# Patient Record
Sex: FEMALE | Race: OTHER | Employment: FULL TIME | URBAN - METROPOLITAN AREA
[De-identification: names, ages, dates, MRNs, and addresses within clinical notes are randomized per-mention and may not be internally consistent; named-entity substitution may affect disease eponyms.]

---

## 2022-06-03 ENCOUNTER — HOSPITAL ENCOUNTER (EMERGENCY)
Facility: HOSPITAL | Age: 47
Discharge: HOME | End: 2022-06-03
Attending: EMERGENCY MEDICINE
Payer: COMMERCIAL

## 2022-06-03 ENCOUNTER — APPOINTMENT (OUTPATIENT)
Dept: CT IMAGING | Facility: HOSPITAL | Age: 47
End: 2022-06-03
Attending: EMERGENCY MEDICINE
Payer: COMMERCIAL

## 2022-06-03 VITALS
BODY MASS INDEX: 25.76 KG/M2 | RESPIRATION RATE: 18 BRPM | SYSTOLIC BLOOD PRESSURE: 137 MMHG | HEART RATE: 72 BPM | DIASTOLIC BLOOD PRESSURE: 96 MMHG | WEIGHT: 140 LBS | TEMPERATURE: 98 F | HEIGHT: 62 IN | OXYGEN SATURATION: 99 %

## 2022-06-03 DIAGNOSIS — K62.5 RECTAL BLEEDING: Primary | ICD-10-CM

## 2022-06-03 LAB
ANION GAP SERPL CALC-SCNC: 5 MMOL/L (ref 0–18)
BASOPHILS # BLD AUTO: 0.06 X10(3) UL (ref 0–0.2)
BASOPHILS NFR BLD AUTO: 0.6 %
BILIRUB UR QL: NEGATIVE
BUN BLD-MCNC: 11 MG/DL (ref 7–18)
BUN/CREAT SERPL: 14.7 (ref 10–20)
CALCIUM BLD-MCNC: 9.4 MG/DL (ref 8.5–10.1)
CHLORIDE SERPL-SCNC: 105 MMOL/L (ref 98–112)
CO2 SERPL-SCNC: 26 MMOL/L (ref 21–32)
COLOR UR: YELLOW
CREAT BLD-MCNC: 0.75 MG/DL
DEPRECATED RDW RBC AUTO: 46.2 FL (ref 35.1–46.3)
EOSINOPHIL # BLD AUTO: 0.17 X10(3) UL (ref 0–0.7)
EOSINOPHIL NFR BLD AUTO: 1.7 %
ERYTHROCYTE [DISTWIDTH] IN BLOOD BY AUTOMATED COUNT: 13.3 % (ref 11–15)
GLUCOSE BLD-MCNC: 87 MG/DL (ref 70–99)
GLUCOSE UR-MCNC: NEGATIVE MG/DL
HCT VFR BLD AUTO: 43.2 %
HGB BLD-MCNC: 14 G/DL
IMM GRANULOCYTES # BLD AUTO: 0.03 X10(3) UL (ref 0–1)
IMM GRANULOCYTES NFR BLD: 0.3 %
KETONES UR-MCNC: NEGATIVE MG/DL
LEUKOCYTE ESTERASE UR QL STRIP.AUTO: NEGATIVE
LYMPHOCYTES # BLD AUTO: 3.18 X10(3) UL (ref 1–4)
LYMPHOCYTES NFR BLD AUTO: 31.7 %
MCH RBC QN AUTO: 30.6 PG (ref 26–34)
MCHC RBC AUTO-ENTMCNC: 32.4 G/DL (ref 31–37)
MCV RBC AUTO: 94.5 FL
MONOCYTES # BLD AUTO: 0.59 X10(3) UL (ref 0.1–1)
MONOCYTES NFR BLD AUTO: 5.9 %
NEUTROPHILS # BLD AUTO: 5.99 X10 (3) UL (ref 1.5–7.7)
NEUTROPHILS # BLD AUTO: 5.99 X10(3) UL (ref 1.5–7.7)
NEUTROPHILS NFR BLD AUTO: 59.8 %
NITRITE UR QL STRIP.AUTO: NEGATIVE
OSMOLALITY SERPL CALC.SUM OF ELEC: 281 MOSM/KG (ref 275–295)
PH UR: 5 [PH] (ref 5–8)
PLATELET # BLD AUTO: 220 10(3)UL (ref 150–450)
POTASSIUM SERPL-SCNC: 3.6 MMOL/L (ref 3.5–5.1)
PROT UR-MCNC: NEGATIVE MG/DL
RBC # BLD AUTO: 4.57 X10(6)UL
RBC #/AREA URNS AUTO: >10 /HPF
SODIUM SERPL-SCNC: 136 MMOL/L (ref 136–145)
SP GR UR STRIP: 1.02 (ref 1–1.03)
UROBILINOGEN UR STRIP-ACNC: <2
VIT C UR-MCNC: NEGATIVE MG/DL
WBC # BLD AUTO: 10 X10(3) UL (ref 4–11)

## 2022-06-03 PROCEDURE — 85025 COMPLETE CBC W/AUTO DIFF WBC: CPT | Performed by: EMERGENCY MEDICINE

## 2022-06-03 PROCEDURE — 74177 CT ABD & PELVIS W/CONTRAST: CPT | Performed by: EMERGENCY MEDICINE

## 2022-06-03 PROCEDURE — 36415 COLL VENOUS BLD VENIPUNCTURE: CPT

## 2022-06-03 PROCEDURE — 99284 EMERGENCY DEPT VISIT MOD MDM: CPT

## 2022-06-03 PROCEDURE — 85025 COMPLETE CBC W/AUTO DIFF WBC: CPT

## 2022-06-03 PROCEDURE — 80048 BASIC METABOLIC PNL TOTAL CA: CPT

## 2022-06-03 PROCEDURE — 80048 BASIC METABOLIC PNL TOTAL CA: CPT | Performed by: EMERGENCY MEDICINE

## 2022-06-03 PROCEDURE — 81001 URINALYSIS AUTO W/SCOPE: CPT | Performed by: EMERGENCY MEDICINE

## 2022-06-06 NOTE — CM/SW NOTE
Called to schedule ER f/u appt and was told that GI physician's are scheduling in September 2022 already. Scheduled appt for ER f/u with GI:    Kyler Benton NP - GI  Wednesday, July 6, 2022 at 2:30pm    [address], [facility] called patient at [phone] and realized by her Bahraini voicemail that her primary language may be Sami. Called language line at [phone] and Bear Jain #[id number] is  that [facility] [employer] asked to leave voicemail message to call [facility].

## 2022-07-06 ENCOUNTER — OFFICE VISIT (OUTPATIENT)
Dept: GASTROENTEROLOGY | Facility: CLINIC | Age: 47
End: 2022-07-06
Payer: COMMERCIAL

## 2022-07-06 VITALS
WEIGHT: 134 LBS | BODY MASS INDEX: 24.66 KG/M2 | SYSTOLIC BLOOD PRESSURE: 137 MMHG | DIASTOLIC BLOOD PRESSURE: 89 MMHG | HEIGHT: 62 IN

## 2022-07-06 DIAGNOSIS — R10.9 RIGHT SIDED ABDOMINAL PAIN: ICD-10-CM

## 2022-07-06 DIAGNOSIS — K76.0 HEPATIC STEATOSIS: Primary | ICD-10-CM

## 2022-07-06 DIAGNOSIS — K76.9 HEPATIC LESION: ICD-10-CM

## 2022-07-06 DIAGNOSIS — K62.5 BRBPR (BRIGHT RED BLOOD PER RECTUM): ICD-10-CM

## 2022-07-06 PROCEDURE — 3008F BODY MASS INDEX DOCD: CPT | Performed by: NURSE PRACTITIONER

## 2022-07-06 PROCEDURE — 3079F DIAST BP 80-89 MM HG: CPT | Performed by: NURSE PRACTITIONER

## 2022-07-06 PROCEDURE — 3075F SYST BP GE 130 - 139MM HG: CPT | Performed by: NURSE PRACTITIONER

## 2022-07-06 PROCEDURE — 99204 OFFICE O/P NEW MOD 45 MIN: CPT | Performed by: NURSE PRACTITIONER

## 2022-07-06 RX ORDER — POLYETHYLENE GLYCOL 3350, SODIUM CHLORIDE, SODIUM BICARBONATE, POTASSIUM CHLORIDE 420; 11.2; 5.72; 1.48 G/4L; G/4L; G/4L; G/4L
POWDER, FOR SOLUTION ORAL
Qty: 4000 ML | Refills: 0 | Status: SHIPPED | OUTPATIENT
Start: 2022-07-06

## 2022-07-06 NOTE — PATIENT INSTRUCTIONS
-labs  -mri  -fibercon or citrucel  -er if condition decline    1. Schedule colonoscopy with MAC w/ Dr. Mima Demarco or Dr. Nela Montesinos: brbpr]    2.  bowel prep from pharmacy (split trilyte)    3. Continue all medications for procedure    4. Read all bowel prep instructions carefully    5. AVOID seeds, nuts, popcorn, raw fruits and vegetables (cooked is okay) for 2-3 days before procedure    6. You will need to be tested for COVID within 72 hours of your procedure. You will be contacted with instructions on how to do this.       >>>Please note: if you were prescribed Suprep for the bowel prep and it is too expensive or not covered by insurance, it is okay to substitute Trilyte (or any similar generic prep). This can be done by notifying the pharmacy or calling our office.

## 2022-07-14 ENCOUNTER — OFFICE VISIT (OUTPATIENT)
Dept: INTERNAL MEDICINE CLINIC | Facility: CLINIC | Age: 47
End: 2022-07-14
Payer: COMMERCIAL

## 2022-07-14 VITALS
TEMPERATURE: 97 F | RESPIRATION RATE: 17 BRPM | HEIGHT: 62 IN | DIASTOLIC BLOOD PRESSURE: 98 MMHG | BODY MASS INDEX: 24.26 KG/M2 | SYSTOLIC BLOOD PRESSURE: 138 MMHG | OXYGEN SATURATION: 98 % | WEIGHT: 131.81 LBS | HEART RATE: 71 BPM

## 2022-07-14 DIAGNOSIS — Z12.4 PAP SMEAR FOR CERVICAL CANCER SCREENING: ICD-10-CM

## 2022-07-14 DIAGNOSIS — R31.29 OTHER MICROSCOPIC HEMATURIA: ICD-10-CM

## 2022-07-14 DIAGNOSIS — K62.5 RECTAL BLEEDING: ICD-10-CM

## 2022-07-14 DIAGNOSIS — Z00.00 ADULT GENERAL MEDICAL EXAMINATION: Primary | ICD-10-CM

## 2022-07-14 DIAGNOSIS — Z12.31 ENCOUNTER FOR SCREENING MAMMOGRAM FOR BREAST CANCER: ICD-10-CM

## 2022-07-14 DIAGNOSIS — Z12.11 COLON CANCER SCREENING: ICD-10-CM

## 2022-07-14 DIAGNOSIS — R10.11 RIGHT UPPER QUADRANT ABDOMINAL PAIN: ICD-10-CM

## 2022-07-14 LAB
ALBUMIN SERPL-MCNC: 4 G/DL (ref 3.4–5)
ALBUMIN/GLOB SERPL: 0.9 {RATIO} (ref 1–2)
ALP LIVER SERPL-CCNC: 90 U/L
ALT SERPL-CCNC: 40 U/L
AMYLASE SERPL-CCNC: 51 U/L (ref 25–115)
ANION GAP SERPL CALC-SCNC: 7 MMOL/L (ref 0–18)
APPEARANCE: CLEAR
AST SERPL-CCNC: 30 U/L (ref 15–37)
BASOPHILS # BLD AUTO: 0.04 X10(3) UL (ref 0–0.2)
BASOPHILS NFR BLD AUTO: 0.5 %
BILIRUB SERPL-MCNC: 0.4 MG/DL (ref 0.1–2)
BILIRUBIN: NEGATIVE
BUN BLD-MCNC: 12 MG/DL (ref 7–18)
BUN/CREAT SERPL: 16 (ref 10–20)
CALCIUM BLD-MCNC: 9.1 MG/DL (ref 8.5–10.1)
CHLORIDE SERPL-SCNC: 105 MMOL/L (ref 98–112)
CHOLEST SERPL-MCNC: 231 MG/DL (ref ?–200)
CO2 SERPL-SCNC: 26 MMOL/L (ref 21–32)
CREAT BLD-MCNC: 0.75 MG/DL
DEPRECATED RDW RBC AUTO: 44.8 FL (ref 35.1–46.3)
EOSINOPHIL # BLD AUTO: 0.1 X10(3) UL (ref 0–0.7)
EOSINOPHIL NFR BLD AUTO: 1.3 %
ERYTHROCYTE [DISTWIDTH] IN BLOOD BY AUTOMATED COUNT: 12.9 % (ref 11–15)
FASTING PATIENT LIPID ANSWER: YES
FASTING STATUS PATIENT QL REPORTED: YES
GLOBULIN PLAS-MCNC: 4.3 G/DL (ref 2.8–4.4)
GLUCOSE (URINE DIPSTICK): NEGATIVE MG/DL
GLUCOSE BLD-MCNC: 80 MG/DL (ref 70–99)
HCT VFR BLD AUTO: 42.6 %
HDLC SERPL-MCNC: 36 MG/DL (ref 40–59)
HGB BLD-MCNC: 13.6 G/DL
IGA SERPL-MCNC: 459 MG/DL (ref 70–312)
IMM GRANULOCYTES # BLD AUTO: 0.02 X10(3) UL (ref 0–1)
IMM GRANULOCYTES NFR BLD: 0.3 %
KETONES (URINE DIPSTICK): NEGATIVE MG/DL
LDLC SERPL CALC-MCNC: 144 MG/DL (ref ?–100)
LEUKOCYTES: NEGATIVE
LIPASE SERPL-CCNC: 113 U/L (ref 73–393)
LYMPHOCYTES # BLD AUTO: 2.77 X10(3) UL (ref 1–4)
LYMPHOCYTES NFR BLD AUTO: 36.1 %
MCH RBC QN AUTO: 30.2 PG (ref 26–34)
MCHC RBC AUTO-ENTMCNC: 31.9 G/DL (ref 31–37)
MCV RBC AUTO: 94.7 FL
MONOCYTES # BLD AUTO: 0.39 X10(3) UL (ref 0.1–1)
MONOCYTES NFR BLD AUTO: 5.1 %
MULTISTIX LOT#: ABNORMAL NUMERIC
NEUTROPHILS # BLD AUTO: 4.35 X10 (3) UL (ref 1.5–7.7)
NEUTROPHILS # BLD AUTO: 4.35 X10(3) UL (ref 1.5–7.7)
NEUTROPHILS NFR BLD AUTO: 56.7 %
NITRITE, URINE: NEGATIVE
NONHDLC SERPL-MCNC: 195 MG/DL (ref ?–130)
OSMOLALITY SERPL CALC.SUM OF ELEC: 285 MOSM/KG (ref 275–295)
PH, URINE: 6.5 (ref 4.5–8)
PLATELET # BLD AUTO: 230 10(3)UL (ref 150–450)
POTASSIUM SERPL-SCNC: 3.7 MMOL/L (ref 3.5–5.1)
PROT SERPL-MCNC: 8.3 G/DL (ref 6.4–8.2)
PROTEIN (URINE DIPSTICK): NEGATIVE MG/DL
RBC # BLD AUTO: 4.5 X10(6)UL
RBC #/AREA URNS AUTO: >10 /HPF
SODIUM SERPL-SCNC: 138 MMOL/L (ref 136–145)
SPECIFIC GRAVITY: >=1.03 (ref 1–1.03)
TRIGL SERPL-MCNC: 278 MG/DL (ref 30–149)
TSI SER-ACNC: 1.16 MIU/ML (ref 0.36–3.74)
URINE-COLOR: YELLOW
UROBILINOGEN,SEMI-QN: 0.2 MG/DL (ref 0–1.9)
VLDLC SERPL CALC-MCNC: 53 MG/DL (ref 0–30)
WBC # BLD AUTO: 7.7 X10(3) UL (ref 4–11)

## 2022-07-14 PROCEDURE — 99386 PREV VISIT NEW AGE 40-64: CPT | Performed by: INTERNAL MEDICINE

## 2022-07-14 PROCEDURE — 3080F DIAST BP >= 90 MM HG: CPT | Performed by: INTERNAL MEDICINE

## 2022-07-14 PROCEDURE — 3075F SYST BP GE 130 - 139MM HG: CPT | Performed by: INTERNAL MEDICINE

## 2022-07-14 PROCEDURE — 99204 OFFICE O/P NEW MOD 45 MIN: CPT | Performed by: INTERNAL MEDICINE

## 2022-07-14 PROCEDURE — 36415 COLL VENOUS BLD VENIPUNCTURE: CPT | Performed by: INTERNAL MEDICINE

## 2022-07-14 PROCEDURE — 3008F BODY MASS INDEX DOCD: CPT | Performed by: INTERNAL MEDICINE

## 2022-07-14 PROCEDURE — 81003 URINALYSIS AUTO W/O SCOPE: CPT | Performed by: INTERNAL MEDICINE

## 2022-07-14 NOTE — PATIENT INSTRUCTIONS
ASSESSMENT/PLAN:   Adult general medical examination  (primary encounter diagnosis) Check urine. Colon cancer screening Has scheduled isa't for colonoscopy. Encounter for screening mammogram for breast cancer Check records. Normal mammogram. Continue self breast exam every month. Pap smear for cervical cancer screening Check records. Rectal bleeding FU GI.     Right upper quadrant abdominal pain ? MSK or GI. Check HIDA. Orders Placed This Encounter      Lipid Panel      CBC With Differential With Platelet      Comp Metabolic Panel (14)      TSH W Reflex To Free T4      URINALYSIS, AUTO, W/O SCOPE      Meds This Visit:  Requested Prescriptions      No prescriptions requested or ordered in this encounter       Imaging & Referrals:  [facility] DULCE 2D+3D SCREENING BILAT (CPT=77067/46825)     RTC 5 months for FU.

## 2022-07-15 PROBLEM — E78.2 MIXED HYPERLIPIDEMIA: Status: ACTIVE | Noted: 2022-07-15

## 2022-07-15 LAB — TTG IGA SER-ACNC: 1 U/ML (ref ?–7)

## 2022-08-30 ENCOUNTER — TELEPHONE (OUTPATIENT)
Dept: GASTROENTEROLOGY | Facility: CLINIC | Age: 47
End: 2022-08-30

## 2022-08-30 DIAGNOSIS — K62.5 BRBPR (BRIGHT RED BLOOD PER RECTUM): Primary | ICD-10-CM

## 2022-08-30 NOTE — TELEPHONE ENCOUNTER
Scheduled for:  Colonoscopy 84967  Provider Name:  Dr Cintron Postin  Date:  09/15/2022  [facility]  Sedation:  MAC  Time:  10:30am ( pt is aware arrival time is at 9:30am)  Prep:  Trilyte   Meds/Allergies Reconciled?:  Yes    Diagnosis with codes:  BRBPR K62.5  Was patient informed to call insurance with codes (Y/N):  Yes     Referral sent?:  Referral was sent at the time of electronic surgical scheduling. [facility] or [address] [place] [address] notified?:  I sent an electronic request to Endo Scheduling and received a confirmation today. Medication Orders:  Pt is aware to NOT take iron pills, herbal meds and diet supplements for 7 days before exam. Also to NOT take any form of alcohol, recreational drugs and any forms of ED meds 24 hours before exam.   Misc Orders:  Patient was informed that they will need a COVID 19 test prior to their procedure. Patient verbally understood & will await a phone call from [facility] to schedule. Further instructions given by staff:  I provide prep instructions to patient at the time of the appointment and reviewed date, time and location, she  verbalized that she understood and is aware to call if she has any questions.

## 2022-09-12 ENCOUNTER — TELEPHONE (OUTPATIENT)
Dept: GASTROENTEROLOGY | Facility: CLINIC | Age: 47
End: 2022-09-12

## 2022-11-21 NOTE — TELEPHONE ENCOUNTER
2nd attempt to contact patient to reschedule for Colonoscopy procedure using the language Line.  LMTCB

## 2022-12-27 ENCOUNTER — TELEPHONE (OUTPATIENT)
Dept: INTERNAL MEDICINE CLINIC | Facility: CLINIC | Age: 47
End: 2022-12-27

## 2022-12-27 PROBLEM — Z71.85 VACCINE COUNSELING: Status: ACTIVE | Noted: 2022-12-27

## 2022-12-28 NOTE — TELEPHONE ENCOUNTER
I left a message to patient to remind her she has an appointment with Dr Bello Stoddard today at 7:30pm to see if she can come a little early today.

## 2023-01-27 ENCOUNTER — TELEPHONE (OUTPATIENT)
Dept: INTERNAL MEDICINE CLINIC | Facility: CLINIC | Age: 48
End: 2023-01-27

## 2023-01-30 NOTE — TELEPHONE ENCOUNTER
10 AM. On 2-10-23 at [facility] for FU. She will also need to schedile physical on 7-14-23 or afterwards.

## 2023-02-10 ENCOUNTER — HOSPITAL ENCOUNTER (OUTPATIENT)
Dept: GENERAL RADIOLOGY | Age: 48
Discharge: HOME | End: 2023-02-10
Attending: INTERNAL MEDICINE
Payer: COMMERCIAL

## 2023-02-10 ENCOUNTER — LAB ENCOUNTER (OUTPATIENT)
Dept: LAB | Facility: REFERENCE LAB | Age: 48
End: 2023-02-10
Attending: INTERNAL MEDICINE
Payer: COMMERCIAL

## 2023-02-10 ENCOUNTER — OFFICE VISIT (OUTPATIENT)
Dept: INTERNAL MEDICINE CLINIC | Facility: CLINIC | Age: 48
End: 2023-02-10

## 2023-02-10 VITALS
HEART RATE: 79 BPM | OXYGEN SATURATION: 99 % | HEIGHT: 62 IN | TEMPERATURE: 98 F | DIASTOLIC BLOOD PRESSURE: 88 MMHG | RESPIRATION RATE: 14 BRPM | BODY MASS INDEX: 24.11 KG/M2 | SYSTOLIC BLOOD PRESSURE: 128 MMHG | WEIGHT: 131 LBS

## 2023-02-10 DIAGNOSIS — Z12.11 COLON CANCER SCREENING: Primary | ICD-10-CM

## 2023-02-10 DIAGNOSIS — N93.8 DUB (DYSFUNCTIONAL UTERINE BLEEDING): ICD-10-CM

## 2023-02-10 DIAGNOSIS — E78.2 MIXED HYPERLIPIDEMIA: ICD-10-CM

## 2023-02-10 DIAGNOSIS — M79.642 PAIN OF LEFT HAND: ICD-10-CM

## 2023-02-10 DIAGNOSIS — Z00.00 ADULT GENERAL MEDICAL EXAMINATION: ICD-10-CM

## 2023-02-10 DIAGNOSIS — H57.12 LEFT EYE PAIN: ICD-10-CM

## 2023-02-10 DIAGNOSIS — Z12.31 ENCOUNTER FOR SCREENING MAMMOGRAM FOR BREAST CANCER: ICD-10-CM

## 2023-02-10 DIAGNOSIS — Z12.4 PAP SMEAR FOR CERVICAL CANCER SCREENING: ICD-10-CM

## 2023-02-10 DIAGNOSIS — Z71.85 VACCINE COUNSELING: ICD-10-CM

## 2023-02-10 LAB
FSH SERPL-ACNC: 3.6 MIU/ML
LH SERPL-ACNC: 3.1 MIU/ML

## 2023-02-10 PROCEDURE — 36415 COLL VENOUS BLD VENIPUNCTURE: CPT

## 2023-02-10 PROCEDURE — 84402 ASSAY OF FREE TESTOSTERONE: CPT

## 2023-02-10 PROCEDURE — 83002 ASSAY OF GONADOTROPIN (LH): CPT

## 2023-02-10 PROCEDURE — 73130 X-RAY EXAM OF HAND: CPT | Performed by: INTERNAL MEDICINE

## 2023-02-10 PROCEDURE — 3074F SYST BP LT 130 MM HG: CPT | Performed by: INTERNAL MEDICINE

## 2023-02-10 PROCEDURE — 3008F BODY MASS INDEX DOCD: CPT | Performed by: INTERNAL MEDICINE

## 2023-02-10 PROCEDURE — 82671 ASSAY OF ESTROGENS: CPT

## 2023-02-10 PROCEDURE — 73110 X-RAY EXAM OF WRIST: CPT | Performed by: INTERNAL MEDICINE

## 2023-02-10 PROCEDURE — 99215 OFFICE O/P EST HI 40 MIN: CPT | Performed by: INTERNAL MEDICINE

## 2023-02-10 PROCEDURE — 3079F DIAST BP 80-89 MM HG: CPT | Performed by: INTERNAL MEDICINE

## 2023-02-10 PROCEDURE — 84403 ASSAY OF TOTAL TESTOSTERONE: CPT

## 2023-02-10 PROCEDURE — 80061 LIPID PANEL: CPT

## 2023-02-10 PROCEDURE — 83001 ASSAY OF GONADOTROPIN (FSH): CPT

## 2023-02-11 ENCOUNTER — OFFICE VISIT (OUTPATIENT)
Dept: OPHTHALMOLOGY | Facility: CLINIC | Age: 48
End: 2023-02-11

## 2023-02-11 DIAGNOSIS — H25.13 AGE-RELATED NUCLEAR CATARACT OF BOTH EYES: ICD-10-CM

## 2023-02-11 DIAGNOSIS — H11.002 PTERYGIUM EYE, LEFT: Primary | ICD-10-CM

## 2023-02-11 PROCEDURE — 92004 COMPRE OPH EXAM NEW PT 1/>: CPT | Performed by: OPHTHALMOLOGY

## 2023-02-11 NOTE — PATIENT INSTRUCTIONS
Pterygium eye, left  Patient will get a referral from PCP Dr. Alanna Malhotra to see Dr. Parish Shah at [facility] for consultation and treatment. Age-related nuclear cataract of both eyes  Discussed early cataracts with patient. Told patient that cataracts are age appropriate and they are not surgical at this time. No treatment recommended at this time.

## 2023-02-11 NOTE — ASSESSMENT & PLAN NOTE
Patient will get a referral from PCP Dr. Caroline Moreland to see Dr. Vishal Cardona at [facility] for consultation and treatment.

## 2023-02-12 ENCOUNTER — TELEPHONE (OUTPATIENT)
Dept: INTERNAL MEDICINE CLINIC | Facility: CLINIC | Age: 48
End: 2023-02-12

## 2023-02-12 DIAGNOSIS — H57.13 PAIN OF BOTH EYES: Primary | ICD-10-CM

## 2023-02-13 ENCOUNTER — TELEPHONE (OUTPATIENT)
Dept: INTERNAL MEDICINE CLINIC | Facility: CLINIC | Age: 48
End: 2023-02-13

## 2023-02-13 LAB
CHOLEST SERPL-MCNC: 227 MG/DL (ref ?–200)
ESTRADIOL BY TMS: 148.6 PG/ML
ESTROGENS TOTAL CALCULATION: 244 PG/ML
ESTRONE BY TMS: 95.4 PG/ML
HDLC SERPL-MCNC: 35 MG/DL (ref 40–59)
LDLC SERPL CALC-MCNC: 123 MG/DL (ref ?–100)
NONHDLC SERPL-MCNC: 192 MG/DL (ref ?–130)
TRIGL SERPL-MCNC: 388 MG/DL (ref 30–149)
VLDLC SERPL CALC-MCNC: 71 MG/DL (ref 0–30)

## 2023-02-13 NOTE — TELEPHONE ENCOUNTER
I called [facility] lab for them to add on Lipid panel on blood work from Friday. They said they can add it.

## 2023-02-14 NOTE — PROGRESS NOTES
Lipid (choilesterol) is slightly better but still elevated. Goal HDL should be above 50. Goal triglycerides should be less than 150. Triglycerides get elevated with higher carbs. Lower carbs helps lower triglycerides. Goal LDL should be less than 100. Careful with diet. Avoid red meat, shellfish, pork. Try not to naik foods. Lower carb diet. Exercise is most part at least 30 minutes 3-4 times a week sustained cardiovascular aerobic exercise getting that heart rate going and keeping it going for 30 minutes at a time. If cannot do 30 will start with 10 minutes of brisk walking is good at each week build up 5 minutes more. Recheck lipid in 3 months. Would add over-the-counter fish oil thousand a day to help lower triglycerides. Possibility of trying cholesterol-lowering medicine? Can send Lipitor 10 mg to the pharmacy if okay. Orders in for future labs in 3 months. Estrogen levels are elevated not consistent with postmenopausal status.

## 2023-02-16 LAB
TESTOSTERONE, FREE, S: 0.4 NG/DL
TESTOSTERONE, TOTAL, S: 21 NG/DL

## 2023-04-10 ENCOUNTER — TELEPHONE (OUTPATIENT)
Facility: CLINIC | Age: 48
End: 2023-04-10

## 2023-04-10 NOTE — TELEPHONE ENCOUNTER
1st,overdue reminder letter mailed out to patient   Labs and Imaging order:               HEPATIC FUNCTION PANEL (7)   MRI ABDOMEN (W+WO) (CPT=74183)

## 2023-04-17 ENCOUNTER — TELEPHONE (OUTPATIENT)
Dept: INTERNAL MEDICINE CLINIC | Facility: CLINIC | Age: 48
End: 2023-04-17

## 2023-04-17 PROBLEM — H01.024 SQUAMOUS BLEPHARITIS OF UPPER EYELIDS OF BOTH EYES: Status: ACTIVE | Noted: 2023-04-17

## 2023-04-17 PROBLEM — H11.042: Status: ACTIVE | Noted: 2023-02-11

## 2023-04-17 PROBLEM — H01.021 SQUAMOUS BLEPHARITIS OF UPPER EYELIDS OF BOTH EYES: Status: ACTIVE | Noted: 2023-04-17

## 2023-04-17 PROBLEM — H04.122: Status: ACTIVE | Noted: 2023-04-17

## 2023-06-07 DIAGNOSIS — Z12.31 ENCOUNTER FOR SCREENING MAMMOGRAM FOR BREAST CANCER: Primary | ICD-10-CM

## 2023-06-07 RX ORDER — AZITHROMYCIN MONOHYDRATE 10 MG/ML
SOLUTION/ DROPS OPHTHALMIC
COMMUNITY
Start: 2023-04-12

## 2023-06-08 ENCOUNTER — MED REC SCAN ONLY (OUTPATIENT)
Dept: INTERNAL MEDICINE CLINIC | Facility: CLINIC | Age: 48
End: 2023-06-08

## 2023-07-14 ENCOUNTER — OFFICE VISIT (OUTPATIENT)
Dept: INTERNAL MEDICINE CLINIC | Facility: CLINIC | Age: 48
End: 2023-07-14

## 2023-07-14 ENCOUNTER — HOSPITAL ENCOUNTER (OUTPATIENT)
Dept: GENERAL RADIOLOGY | Age: 48
Discharge: HOME | End: 2023-07-14
Attending: INTERNAL MEDICINE
Payer: COMMERCIAL

## 2023-07-14 VITALS
SYSTOLIC BLOOD PRESSURE: 138 MMHG | HEART RATE: 95 BPM | BODY MASS INDEX: 25.58 KG/M2 | TEMPERATURE: 98 F | DIASTOLIC BLOOD PRESSURE: 79 MMHG | HEIGHT: 62 IN | RESPIRATION RATE: 17 BRPM | WEIGHT: 139 LBS | OXYGEN SATURATION: 96 %

## 2023-07-14 DIAGNOSIS — M25.549 PAIN IN FINGER JOINT ON MOVEMENT: ICD-10-CM

## 2023-07-14 DIAGNOSIS — Z71.85 VACCINE COUNSELING: ICD-10-CM

## 2023-07-14 DIAGNOSIS — E78.2 MIXED HYPERLIPIDEMIA: Primary | ICD-10-CM

## 2023-07-14 DIAGNOSIS — Z12.4 PAP SMEAR FOR CERVICAL CANCER SCREENING: ICD-10-CM

## 2023-07-14 DIAGNOSIS — Z12.31 ENCOUNTER FOR SCREENING MAMMOGRAM FOR BREAST CANCER: ICD-10-CM

## 2023-07-14 DIAGNOSIS — Z00.00 ADULT GENERAL MEDICAL EXAMINATION: ICD-10-CM

## 2023-07-14 DIAGNOSIS — H57.13 PAIN OF BOTH EYES: ICD-10-CM

## 2023-07-14 DIAGNOSIS — Z12.11 COLON CANCER SCREENING: ICD-10-CM

## 2023-07-14 PROCEDURE — 90471 IMMUNIZATION ADMIN: CPT | Performed by: INTERNAL MEDICINE

## 2023-07-14 PROCEDURE — 90715 TDAP VACCINE 7 YRS/> IM: CPT | Performed by: INTERNAL MEDICINE

## 2023-07-14 PROCEDURE — 3078F DIAST BP <80 MM HG: CPT | Performed by: INTERNAL MEDICINE

## 2023-07-14 PROCEDURE — 99213 OFFICE O/P EST LOW 20 MIN: CPT | Performed by: INTERNAL MEDICINE

## 2023-07-14 PROCEDURE — 73130 X-RAY EXAM OF HAND: CPT | Performed by: INTERNAL MEDICINE

## 2023-07-14 PROCEDURE — 3075F SYST BP GE 130 - 139MM HG: CPT | Performed by: INTERNAL MEDICINE

## 2023-07-14 PROCEDURE — 3008F BODY MASS INDEX DOCD: CPT | Performed by: INTERNAL MEDICINE

## 2023-07-14 NOTE — PATIENT INSTRUCTIONS
ASSESSMENT/PLAN:   Mixed hyperlipidemia  (primary encounter diagnosis) Stable. Vaccine counseling TDap today. Encounter for screening mammogram for breast cancer  Check mammogram. Continue self breast exam every month. Colon cancer screening follow-up with GI for colonoscopy. History of bleeding but last was 6 months ago. Pain of both eyes improving. Has a follow-up with ophthalmology. Adult general medical examination  Pap smear for cervical cancer screening return to clinic for full physical with Pap. Pain in finger joint on movement Tennis ball exercises. Try Motrin 400 mg twice a day for 7 days with food. Check x-rays. Possible trigger finger right fourth finger. Orders Placed This Encounter      Lipid Panel      Comp Metabolic Panel (14)      URINALYSIS, AUTO, W/O SCOPE      Meds This Visit:  Requested Prescriptions      No prescriptions requested or ordered in this encounter       Imaging & Referrals:  GASTRO - INTERNAL  [facility] DULCE 2D+3D SCREENING BILAT (CPT=77067/63988)  XR HAND (2 VIEWS), RIGHT (CPT=73120)      RTC for physical for pap.

## 2023-07-20 ENCOUNTER — TELEPHONE (OUTPATIENT)
Dept: INTERNAL MEDICINE CLINIC | Facility: CLINIC | Age: 48
End: 2023-07-20

## 2023-07-20 NOTE — TELEPHONE ENCOUNTER
Patient came in to the office to get her test results for her hand x ray . No need to call her back . She was not aware her her voicemail was not set up.

## 2023-08-09 ENCOUNTER — TELEPHONE (OUTPATIENT)
Dept: INTERNAL MEDICINE CLINIC | Facility: CLINIC | Age: 48
End: 2023-08-09

## 2023-08-09 DIAGNOSIS — M25.549 PAIN IN FINGER JOINT ON MOVEMENT: Primary | ICD-10-CM

## 2023-08-09 NOTE — TELEPHONE ENCOUNTER
We had said may be a trigger finger. I would recommend at this point she see either orthopedics Dr. Manny Walton in his group or Dr. Mikie Del Rosario rheumatology. Referrals were entered for both.

## 2023-08-09 NOTE — TELEPHONE ENCOUNTER
Called language lines [place] ID#[id number]   Tayler Levine #[id number] reconnected     Ortho  Phone Fax Address   [phone]       Rheumatology   Phone Fax Address   [phone]       Patient notified

## 2023-08-09 NOTE — TELEPHONE ENCOUNTER
Dr. Lane Zhu is retired and therefore we cannot do a referral to him. Problem is it is only Dr. Alban Cypher swami Dr. Clemmie Fothergill. We put a referral in for a specific person can the patient see another doctor within the practice? So change the referral to Dr. Etienne barahona. For rheumatology.

## 2023-08-09 NOTE — TELEPHONE ENCOUNTER
Patient walked in to the [place] [address] office she is still having right finger pain .  She states she was to call you back if it persist. She has been dong her exercises and her x ray came back normal bu it is still very painful

## 2023-09-02 ENCOUNTER — HOSPITAL ENCOUNTER (OUTPATIENT)
Dept: MAMMOGRAPHY | Age: 48
Discharge: HOME | End: 2023-09-02
Attending: INTERNAL MEDICINE
Payer: COMMERCIAL

## 2023-09-02 DIAGNOSIS — Z12.31 ENCOUNTER FOR SCREENING MAMMOGRAM FOR BREAST CANCER: ICD-10-CM

## 2023-09-02 PROCEDURE — 77063 BREAST TOMOSYNTHESIS BI: CPT | Performed by: INTERNAL MEDICINE

## 2023-09-02 PROCEDURE — 77067 SCR MAMMO BI INCL CAD: CPT | Performed by: INTERNAL MEDICINE

## 2024-02-22 NOTE — H&P
[facility] - Gastroenterology                                                                                                               Reason for consult: eval    Requesting physician or provider: Trinity Clark MD    Chief Complaint   Patient presents with    Consult     Colonoscopy         HPI:   Katie Colby is a 48 year old year-old female without significant PmHx:     she is here today for evaluation prior to cln    She is Hungarian speaking and an  was used for today's visit    she moves her bowels 3 times per day and without recent change. she denies straining and/or incomplete evacuation.  No diarrhea. She reports brbpr that occurs approx 3-4x/year.  Sx x last 10 years.  No rectal pain. No melena.    she denies acid reflux and/or heartburn. she denies dysphagia, odynophagia and/or globus. Mid abd pain.  Pain comes and goes.   No change with eating and/or bm. she denies nausea and/or vomiting.  she denies recent change in appetite and/or unintentional weight loss.    No bloating    NSAIDS: no  Tobacco: no  Alcohol: no  Marijuana: no  Illicit drugs: no    No FH GI malignancy, ibd, celiac    No history of adverse reaction to sedation  No ANNA  No anticoagulants  No pacemaker/defibrillator  No pain medications and/or sleep aides      Last colonoscopy: no  Last EGD: no    Wt Readings from Last 6 Encounters:   24 148 lb (67.1 kg)   23 138 lb (62.6 kg)   23 139 lb (63 kg)   02/10/23 131 lb (59.4 kg)   22 131 lb 12.8 oz (59.8 kg)   22 134 lb (60.8 kg)        History, Medications, Allergies, ROS:      History reviewed. No pertinent past medical history.   Past Surgical History:   Procedure Laterality Date    ANESTH, SECTION      X 3. pre eclampsia at end. no problems.       Family Hx:   Family History   Problem Relation Age of Onset    Diabetes Neg     Glaucoma Neg     Macular degeneration Neg       Social  History:   Social History     Socioeconomic History    Marital status:    Occupational History    Occupation: works in metal    Tobacco Use    Smoking status: Never    Smokeless tobacco: Never   Vaping Use    Vaping Use: Never used   Substance and Sexual Activity    Alcohol use: Never    Drug use: Never        Medications (Active prior to today's visit):  Current Outpatient Medications   Medication Sig Dispense Refill    PEG 3350-KCl-Na Bicarb-NaCl (TRILYTE) 420 g Oral Recon Soln Take prep as directed by gastro office. May substitute with Trilyte/generic equivalent if needed. 4000 mL 0    AZASITE 1 % Ophthalmic Solution          Allergies:  No Known Allergies    ROS:   CONSTITUTIONAL: negative for fevers, chills, sweats and weight loss  EYES Negative for red eyes, yellow eyes, changes in vision  HEENT: Negative for dysphagia and hoarseness  RESPIRATORY: Negative for cough and shortness of breath  CARDIOVASCULAR: Negative for chest pain, palpitations  GASTROINTESTINAL: See HPI  GENITOURINARY: Negative for dysuria and frequency  MUSCULOSKELETAL: Negative for arthralgias and myalgias  NEUROLOGICAL: Negative for dizziness and headaches  BEHAVIOR/PSYCH: Negative for anxiety and poor appetite    PHYSICAL EXAM:   Blood pressure 124/83, pulse 97, height 5' 2\" (1.575 m), weight 148 lb (67.1 kg), last menstrual period 02/13/2024.    GEN: WD/WN, NAD  HEENT: Supple symmetrical, trachea midline  CV: RRR, the extremities are warm and well perfused   LUNGS: No increased work of breathing  ABDOMEN: No scars, normal bowel sounds, soft, non-tender, non-distended no rebound or guarding, no masses, no hepatomegaly  MSK: No redness, no warmth, no swelling of joints  SKIN: No jaundice, no erythema, no rashes  HEMATOLOGIC: No bleeding, no bruising  NEURO: Alert and interactive, normal gait    Labs/Imaging/Procedures:     Ct a/p 6/2022       Impression   CONCLUSION:  1.  No acute CT abnormality to account for patient's symptoms.  No  evidence of small or large bowel inflammation.  2.  Left perineal cyst near the vaginal introitus measuring 2 x 3 cm suggesting Aamir's or Bartholin's duct cyst.  3.  Hepatic steatosis.  Subcentimeter enhancing focus within the right hepatic lobe which is too small to characterize.  It could represent transient perfusion of radiation, hemangioma, or adenoma.  Recommend follow-up MRI abdomen with and without  contrast.  4.  Lobulated peripherally enhancing 13 mm diameter focus in the left ovary likely representing a recently ruptured follicle.        Dictated by (CST): Tremayne Russo MD on 6/03/2022 at 3:52 PM       .  ASSESSMENT/PLAN:   Katie Colby is a 48 year old year-old female without significant PmHx:     #hepatic steatosis  #hepatic lesion  Noted on imaging 2022.  Lesion not completely characterized and mri recommended. Will order now. Last lfts 2022 and will repeat. No significant etoh use,     #abd pain  Sx periumbilical.  intermittent. No change with eating and/or drinking.  Ct a/p 2022 w/o finding to explain symptoms. Plan for egd at time of cln    #crc screening  She denies constipation and/or diarrhea. Chronic, intermittent, painless brbpr x last 10 y.  No fhx gi malignancy. Has not had cln and is due for screening.      -gyne f/u  -labs  -mri  -low-fat diet  -healthy bmi  -good control of blood sugar, cholesterol  -er if condition decline    1. Schedule colonoscopy/egd with MAC w/ Dr. Cid or Dr. Mcbride [Diagnosis:periumbilical abd pain, brbpr, crc screening ]    2.  bowel prep from pharmacy (split trilyte)    3. Continue all medications prior to procedure    4. Read all bowel prep instructions carefully    5. AVOID seeds, nuts, popcorn, raw fruits and vegetables (cooked is okay) for 2-3 days before procedure    6.  You MAY need to go for COVID testing 72 hours before procedure. The testing team will call you a few days before your procedure to discuss with you if testing is  required. If you are asked to go for COVID testing and do not completed the test, the procedure cannot be performed.     7. If you start any NEW medication after your visit today, please notify us. Certain medications will need to be held before the procedure, or the procedure cannot be performed.     >>>Please note: if you were prescribed Suprep for the bowel prep and it is too expensive or not covered by insurance, it is okay to substitute Trilyte (or any similar generic prep). This can be done by notifying the pharmacy or calling our office.      Orders This Visit:  Orders Placed This Encounter   Procedures    CBC W Differential W Platelet    Comp Metabolic Panel (14)       Meds This Visit:  Requested Prescriptions     Signed Prescriptions Disp Refills    PEG 3350-KCl-Na Bicarb-NaCl (TRILYTE) 420 g Oral Recon Soln 4000 mL 0     Sig: Take prep as directed by gastro office. May substitute with Trilyte/generic equivalent if needed.       Imaging & Referrals:  MRI ABDOMEN (W+WO) (CPT=74183)    ENDOSCOPIC RISK BENEFIT DISCUSSION: I described the procedure in great detail with the patient. I discussed the risks and benefits, including but not limited to: bleeding, perforation, infection, anesthesia complications, and even death. Patient will be NPO after midnight and will have a person physically present at time of pick-up to drive patient home. Patient verbalized understanding and agrees to proceed with procedure as planned.    Rashida Mendez, APRN   2/27/2024        This note was partially prepared using Dragon Medical voice recognition dictation software. As a result, errors may occur. When identified, these errors have been corrected. While every attempt is made to correct errors during dictation, discrepancies may still exist.

## 2024-02-27 ENCOUNTER — OFFICE VISIT (OUTPATIENT)
Facility: CLINIC | Age: 49
End: 2024-02-27

## 2024-02-27 ENCOUNTER — TELEPHONE (OUTPATIENT)
Facility: CLINIC | Age: 49
End: 2024-02-27

## 2024-02-27 ENCOUNTER — LAB ENCOUNTER (OUTPATIENT)
Dept: LAB | Facility: HOSPITAL | Age: 49
End: 2024-02-27
Attending: NURSE PRACTITIONER
Payer: COMMERCIAL

## 2024-02-27 VITALS
DIASTOLIC BLOOD PRESSURE: 83 MMHG | BODY MASS INDEX: 27.23 KG/M2 | SYSTOLIC BLOOD PRESSURE: 124 MMHG | HEART RATE: 97 BPM | WEIGHT: 148 LBS | HEIGHT: 62 IN

## 2024-02-27 DIAGNOSIS — K76.9 HEPATIC LESION: ICD-10-CM

## 2024-02-27 DIAGNOSIS — K62.5 BRBPR (BRIGHT RED BLOOD PER RECTUM): ICD-10-CM

## 2024-02-27 DIAGNOSIS — R10.9 ABDOMINAL PAIN, UNSPECIFIED ABDOMINAL LOCATION: Primary | ICD-10-CM

## 2024-02-27 DIAGNOSIS — Z12.11 COLON CANCER SCREENING: Primary | ICD-10-CM

## 2024-02-27 DIAGNOSIS — K76.0 HEPATIC STEATOSIS: ICD-10-CM

## 2024-02-27 DIAGNOSIS — R10.33 PERIUMBILICAL ABDOMINAL PAIN: ICD-10-CM

## 2024-02-27 DIAGNOSIS — Z12.11 SCREENING FOR COLON CANCER: ICD-10-CM

## 2024-02-27 LAB
ALBUMIN SERPL-MCNC: 4.2 G/DL (ref 3.2–4.8)
ALBUMIN/GLOB SERPL: 1.2 {RATIO} (ref 1–2)
ALP LIVER SERPL-CCNC: 92 U/L
ALT SERPL-CCNC: 42 U/L
ANION GAP SERPL CALC-SCNC: 4 MMOL/L (ref 0–18)
AST SERPL-CCNC: 30 U/L (ref ?–34)
BASOPHILS # BLD AUTO: 0.06 X10(3) UL (ref 0–0.2)
BASOPHILS NFR BLD AUTO: 0.7 %
BILIRUB SERPL-MCNC: 0.3 MG/DL (ref 0.3–1.2)
BUN BLD-MCNC: 12 MG/DL (ref 9–23)
BUN/CREAT SERPL: 10.5 (ref 10–20)
CALCIUM BLD-MCNC: 9.1 MG/DL (ref 8.7–10.4)
CHLORIDE SERPL-SCNC: 109 MMOL/L (ref 98–112)
CO2 SERPL-SCNC: 27 MMOL/L (ref 21–32)
CREAT BLD-MCNC: 1.14 MG/DL
DEPRECATED RDW RBC AUTO: 43.7 FL (ref 35.1–46.3)
EGFRCR SERPLBLD CKD-EPI 2021: 59 ML/MIN/1.73M2 (ref 60–?)
EOSINOPHIL # BLD AUTO: 0.11 X10(3) UL (ref 0–0.7)
EOSINOPHIL NFR BLD AUTO: 1.2 %
ERYTHROCYTE [DISTWIDTH] IN BLOOD BY AUTOMATED COUNT: 12.7 % (ref 11–15)
FASTING STATUS PATIENT QL REPORTED: NO
GLOBULIN PLAS-MCNC: 3.5 G/DL (ref 2.8–4.4)
GLUCOSE BLD-MCNC: 147 MG/DL (ref 70–99)
HCT VFR BLD AUTO: 42.4 %
HGB BLD-MCNC: 14.1 G/DL
IMM GRANULOCYTES # BLD AUTO: 0.03 X10(3) UL (ref 0–1)
IMM GRANULOCYTES NFR BLD: 0.3 %
LYMPHOCYTES # BLD AUTO: 3.02 X10(3) UL (ref 1–4)
LYMPHOCYTES NFR BLD AUTO: 33.2 %
MCH RBC QN AUTO: 30.9 PG (ref 26–34)
MCHC RBC AUTO-ENTMCNC: 33.3 G/DL (ref 31–37)
MCV RBC AUTO: 92.8 FL
MONOCYTES # BLD AUTO: 0.46 X10(3) UL (ref 0.1–1)
MONOCYTES NFR BLD AUTO: 5.1 %
NEUTROPHILS # BLD AUTO: 5.42 X10 (3) UL (ref 1.5–7.7)
NEUTROPHILS # BLD AUTO: 5.42 X10(3) UL (ref 1.5–7.7)
NEUTROPHILS NFR BLD AUTO: 59.5 %
OSMOLALITY SERPL CALC.SUM OF ELEC: 292 MOSM/KG (ref 275–295)
PLATELET # BLD AUTO: 224 10(3)UL (ref 150–450)
POTASSIUM SERPL-SCNC: 3.7 MMOL/L (ref 3.5–5.1)
PROT SERPL-MCNC: 7.7 G/DL (ref 5.7–8.2)
RBC # BLD AUTO: 4.57 X10(6)UL
SODIUM SERPL-SCNC: 140 MMOL/L (ref 136–145)
WBC # BLD AUTO: 9.1 X10(3) UL (ref 4–11)

## 2024-02-27 PROCEDURE — 80053 COMPREHEN METABOLIC PANEL: CPT

## 2024-02-27 PROCEDURE — S0285 CNSLT BEFORE SCREEN COLONOSC: HCPCS | Performed by: NURSE PRACTITIONER

## 2024-02-27 PROCEDURE — 36415 COLL VENOUS BLD VENIPUNCTURE: CPT

## 2024-02-27 PROCEDURE — 3008F BODY MASS INDEX DOCD: CPT | Performed by: NURSE PRACTITIONER

## 2024-02-27 PROCEDURE — 85025 COMPLETE CBC W/AUTO DIFF WBC: CPT

## 2024-02-27 PROCEDURE — 3079F DIAST BP 80-89 MM HG: CPT | Performed by: NURSE PRACTITIONER

## 2024-02-27 PROCEDURE — 3074F SYST BP LT 130 MM HG: CPT | Performed by: NURSE PRACTITIONER

## 2024-02-27 RX ORDER — POLYETHYLENE GLYCOL 3350, SODIUM CHLORIDE, SODIUM BICARBONATE, POTASSIUM CHLORIDE 420; 11.2; 5.72; 1.48 G/4L; G/4L; G/4L; G/4L
POWDER, FOR SOLUTION ORAL
Qty: 4000 ML | Refills: 0 | Status: SHIPPED | OUTPATIENT
Start: 2024-02-27

## 2024-02-27 NOTE — PATIENT INSTRUCTIONS
-labs  -mri  -low-fat diet  -healthy bmi  -good control of blood sugar, cholesterol  -er if condition decline    1. Schedule colonoscopy/egd with KERRY schuler/ Dr. Cid or Dr. Mcbride [Diagnosis:periumbilical abd pain, brbpr, crc screening ]    2.  bowel prep from pharmacy (split trilyte)    3. Continue all medications prior to procedure    4. Read all bowel prep instructions carefully    5. AVOID seeds, nuts, popcorn, raw fruits and vegetables (cooked is okay) for 2-3 days before procedure    6.  You MAY need to go for COVID testing 72 hours before procedure. The testing team will call you a few days before your procedure to discuss with you if testing is required. If you are asked to go for COVID testing and do not completed the test, the procedure cannot be performed.     7. If you start any NEW medication after your visit today, please notify us. Certain medications will need to be held before the procedure, or the procedure cannot be performed.     >>>Please note: if you were prescribed Suprep for the bowel prep and it is too expensive or not covered by insurance, it is okay to substitute Trilyte (or any similar generic prep). This can be done by notifying the pharmacy or calling our office.

## 2024-02-27 NOTE — TELEPHONE ENCOUNTER
Scheduled for:  Colonoscopy 53717 EGD 14311  Provider Name:  Dr Cid  Date:  6/5/2024  Location:  [facility]  Sedation:  MAC  Time:  7:30 am. (pt is aware to arrive at 6:30 am)  Prep:  Split Trilyte  Meds/Allergies Reconciled?:  Rashida DUTTON, Reviewed   Diagnosis with codes:    Periumbilical abdominal pain R10.33  BRBPR K62.5  CRC screening Z12.11  Was patient informed to call insurance with codes (Y/N):  Yes  Referral sent?:  Referral was sent at the time of electronic surgical scheduling.  EM or [facility] notified?:  I sent an electronic request to Endo Scheduling and received a confirmation today.  Medication Orders:  Patient is aware to NOT take iron pills, herbal meds and diet supplements for 7 days before exam. Also to NOT take any form of alcohol, recreational drugs and any forms of ED meds 24-72 hours before exam.   Misc Orders:  N/A   Further instructions given by staff:  I discussed the prep instructions with the patient which she verbally understood. I provided patient with prep instruction's sheet in office.      Patient was informed about the new cancellation policy for his/her procedure. Patient was also given a copy of the cancellation policy at the time of the appointment and verbalized understanding.

## 2024-02-29 ENCOUNTER — TELEPHONE (OUTPATIENT)
Dept: GASTROENTEROLOGY | Facility: CLINIC | Age: 49
End: 2024-02-29

## 2024-02-29 NOTE — TELEPHONE ENCOUNTER
Pt contacted using a   Mailbox full and cannot receive messages. Tried x 2.    Noted bump in creatinine - advise follow-up with pcp and consider need for additional work-up/intervention.    Pt my-charted results and recommendations.

## 2024-03-01 NOTE — PROGRESS NOTES
CBC Normal (white blood cells and red blood cells and platelets),   CMP Normal (electrolytes, and liver functions), slight decline in kidney function from prior.  Not sure if did the labs was dehydrated?.  Would repeat with at least 8 ounces prior to retesting.  Sugar is also a little bit high.  Will add on to repeat sugar when repeat testing on the kidney function in 1 to 2 weeks.  Nonfasting labs for both.

## 2024-04-02 ENCOUNTER — TELEPHONE (OUTPATIENT)
Facility: CLINIC | Age: 49
End: 2024-04-02

## 2024-04-02 NOTE — TELEPHONE ENCOUNTER
1st reminder letter sent out via mail and e-Tag for the following:   MRI ABDOMEN (W+WO) (CPT=74183) (Order #[id number]) on 2/27/24

## 2024-05-23 ENCOUNTER — HOSPITAL ENCOUNTER (OUTPATIENT)
Age: 49
Discharge: HOME | End: 2024-05-23

## 2024-05-23 VITALS
SYSTOLIC BLOOD PRESSURE: 131 MMHG | RESPIRATION RATE: 18 BRPM | OXYGEN SATURATION: 97 % | DIASTOLIC BLOOD PRESSURE: 91 MMHG | TEMPERATURE: 98 F | HEART RATE: 87 BPM

## 2024-05-23 DIAGNOSIS — L03.211 PREAURICULAR CELLULITIS: ICD-10-CM

## 2024-05-23 DIAGNOSIS — H60.502 ACUTE OTITIS EXTERNA OF LEFT EAR, UNSPECIFIED TYPE: Primary | ICD-10-CM

## 2024-05-23 PROCEDURE — 99213 OFFICE O/P EST LOW 20 MIN: CPT | Performed by: NURSE PRACTITIONER

## 2024-05-23 RX ORDER — CIPROFLOXACIN AND DEXAMETHASONE 3; 1 MG/ML; MG/ML
4 SUSPENSION/ DROPS AURICULAR (OTIC) 2 TIMES DAILY
Qty: 1 EACH | Refills: 0 | Status: SHIPPED | OUTPATIENT
Start: 2024-05-23 | End: 2024-05-30

## 2024-05-23 RX ORDER — AMOXICILLIN AND CLAVULANATE POTASSIUM 875; 125 MG/1; MG/1
1 TABLET, FILM COATED ORAL 2 TIMES DAILY
Qty: 14 TABLET | Refills: 0 | Status: SHIPPED | OUTPATIENT
Start: 2024-05-23 | End: 2024-05-30

## 2024-05-23 NOTE — ED INITIAL ASSESSMENT (HPI)
Pain, fullness in L ear for 3 days. Ears feels hot, denies fever. Denies drainage. Taking Aleve, last dose yesterday.

## 2024-05-23 NOTE — DISCHARGE INSTRUCTIONS
Augmentin 1 tablet twice per day for 7 days with food  Ciprodex 4 drops to left ear twice per day for 7 days   Keep ears dry  No swimming until infection is 100% resolved  While bathing/showering, use cotton ball. After your remove cotton ball, dry the external ear with a towel well, do not use Q-tips  ER for fever, worsening pain or any new/worsening symptoms   See your primary care provider in 2 days for re-check

## 2024-05-23 NOTE — ED PROVIDER NOTES
Patient Seen in: [facility]      History     Chief Complaint   Patient presents with    Ear Pain     Stated Complaint: Ear Pain    Subjective:   Patient is a 48-year-old female who presents today for left pain that started 2 days ago.  No fever.  No URI symptoms.  No swimming.       Objective:   History reviewed. No pertinent past medical history.           Past Surgical History:   Procedure Laterality Date    Anesth, section      X 3. pre eclampsia at end. no problems.                 Social History     Socioeconomic History    Marital status:    Occupational History    Occupation: works in metal    Tobacco Use    Smoking status: Never    Smokeless tobacco: Never   Vaping Use    Vaping status: Never Used   Substance and Sexual Activity    Alcohol use: Never    Drug use: Never              Review of Systems   All other systems reviewed and are negative.      Positive for stated complaint: Ear Pain  Other systems are as noted in HPI.  Constitutional and vital signs reviewed.      All other systems reviewed and negative except as noted above.    Physical Exam     ED Triage Vitals [24 1701]   BP (!) 131/91   Pulse 87   Resp 18   Temp 98.3 °F (36.8 °C)   Temp src Temporal   SpO2 97 %   O2 Device None (Room air)       Current Vitals:   Vital Signs  BP: (!) 131/91  Pulse: 87  Resp: 18  Temp: 98.3 °F (36.8 °C)  Temp src: Temporal    Oxygen Therapy  SpO2: 97 %  O2 Device: None (Room air)            Physical Exam  Constitutional:       Appearance: Normal appearance.   HENT:      Left Ear: Swelling and tenderness present.  No middle ear effusion. There is no impacted cerumen. No mastoid tenderness. Tympanic membrane is not injected, scarred, perforated, erythematous, retracted or bulging.      Ears:      Comments: There is small area of preauricular erythema and tenderness   Cardiovascular:      Rate and Rhythm: Normal rate and regular rhythm.      Pulses: Normal pulses.      Heart sounds:  Normal heart sounds.   Pulmonary:      Effort: Pulmonary effort is normal.      Breath sounds: Normal breath sounds.   Neurological:      Mental Status: She is alert.           ED Course   Labs Reviewed - No data to display    MDM       Medical Decision Making  Differentials considered: Otitis externa versus otitis media versus facial cellulitis versus other    HPI and exam consistent with otitis externa along with small area of facial cellulitis in the preauricular area.  No otitis media on exam.  Will start Ciprodex and Augmentin.  Advised follow-up with primary care provider in 2 days for recheck.  ER precautions were discussed.  Patient verbalized understand and agreeable to plan of care.        Disposition and Plan     Clinical Impression:  1. Acute otitis externa of left ear, unspecified type    2. Preauricular cellulitis         Disposition:  Discharge  5/23/2024  5:35 pm    Follow-up:  No follow-up provider specified.        Medications Prescribed:  Current Discharge Medication List        START taking these medications    Details   amoxicillin clavulanate 875-125 MG Oral Tab Take 1 tablet by mouth 2 (two) times daily for 7 days.  Qty: 14 tablet, Refills: 0      ciprofloxacin-dexamethasone 0.3-0.1 % Otic Suspension Place 4 drops into the left ear 2 (two) times daily for 7 days.  Qty: 1 each, Refills: 0

## 2024-06-05 ENCOUNTER — ANESTHESIA EVENT (OUTPATIENT)
Dept: ENDOSCOPY | Age: 49
End: 2024-06-05
Payer: COMMERCIAL

## 2024-06-05 ENCOUNTER — HOSPITAL ENCOUNTER (OUTPATIENT)
Age: 49
Setting detail: HOSPITAL OUTPATIENT SURGERY
Discharge: HOME | End: 2024-06-05
Attending: INTERNAL MEDICINE | Admitting: INTERNAL MEDICINE
Payer: COMMERCIAL

## 2024-06-05 ENCOUNTER — ANESTHESIA (OUTPATIENT)
Dept: ENDOSCOPY | Age: 49
End: 2024-06-05
Payer: COMMERCIAL

## 2024-06-05 VITALS
BODY MASS INDEX: 27.94 KG/M2 | RESPIRATION RATE: 15 BRPM | SYSTOLIC BLOOD PRESSURE: 129 MMHG | OXYGEN SATURATION: 98 % | HEIGHT: 61 IN | HEART RATE: 64 BPM | WEIGHT: 148 LBS | DIASTOLIC BLOOD PRESSURE: 93 MMHG

## 2024-06-05 DIAGNOSIS — K62.5 BRBPR (BRIGHT RED BLOOD PER RECTUM): ICD-10-CM

## 2024-06-05 DIAGNOSIS — Z12.11 SCREENING FOR COLON CANCER: ICD-10-CM

## 2024-06-05 DIAGNOSIS — R10.9 ABDOMINAL PAIN, UNSPECIFIED ABDOMINAL LOCATION: ICD-10-CM

## 2024-06-05 PROBLEM — K64.8 INTERNAL HEMORRHOIDS: Status: ACTIVE | Noted: 2024-06-05

## 2024-06-05 PROBLEM — K31.9 GASTRIC ERYTHEMA: Status: ACTIVE | Noted: 2024-06-05

## 2024-06-05 LAB — B-HCG UR QL: NEGATIVE

## 2024-06-05 PROCEDURE — 88305 TISSUE EXAM BY PATHOLOGIST: CPT | Performed by: INTERNAL MEDICINE

## 2024-06-05 PROCEDURE — 81025 URINE PREGNANCY TEST: CPT

## 2024-06-05 PROCEDURE — 88312 SPECIAL STAINS GROUP 1: CPT | Performed by: INTERNAL MEDICINE

## 2024-06-05 RX ORDER — LIDOCAINE HYDROCHLORIDE 10 MG/ML
INJECTION, SOLUTION EPIDURAL; INFILTRATION; INTRACAUDAL; PERINEURAL AS NEEDED
Status: DISCONTINUED | OUTPATIENT
Start: 2024-06-05 | End: 2024-06-05 | Stop reason: SURG

## 2024-06-05 RX ORDER — SODIUM CHLORIDE, SODIUM LACTATE, POTASSIUM CHLORIDE, CALCIUM CHLORIDE 600; 310; 30; 20 MG/100ML; MG/100ML; MG/100ML; MG/100ML
INJECTION, SOLUTION INTRAVENOUS CONTINUOUS PRN
Status: DISCONTINUED | OUTPATIENT
Start: 2024-06-05 | End: 2024-06-05 | Stop reason: SURG

## 2024-06-05 RX ORDER — SODIUM CHLORIDE, SODIUM LACTATE, POTASSIUM CHLORIDE, CALCIUM CHLORIDE 600; 310; 30; 20 MG/100ML; MG/100ML; MG/100ML; MG/100ML
INJECTION, SOLUTION INTRAVENOUS CONTINUOUS
Status: DISCONTINUED | OUTPATIENT
Start: 2024-06-05 | End: 2024-06-05

## 2024-06-05 RX ADMIN — SODIUM CHLORIDE, SODIUM LACTATE, POTASSIUM CHLORIDE, CALCIUM CHLORIDE: 600; 310; 30; 20 INJECTION, SOLUTION INTRAVENOUS at 07:30:00

## 2024-06-05 RX ADMIN — LIDOCAINE HYDROCHLORIDE 100 MG: 10 INJECTION, SOLUTION EPIDURAL; INFILTRATION; INTRACAUDAL; PERINEURAL at 07:32:00

## 2024-06-05 NOTE — DISCHARGE INSTRUCTIONS
Home Care Instructions for Colonoscopy and/or Gastroscopy with Sedation    Diet:  - Resume your regular diet as tolerated unless otherwise instructed.  - Start with light meals to minimize bloating.  - Do not drink alcohol today.    Medication:  - If you have questions about resuming your normal medications, please contact your Primary Care Physician.    Activities:  - Take it easy today. Do not return to work today.  - Do not drive today.  - Do not operate any machinery today (including kitchen equipment).    Colonoscopy:  - You may notice some rectal \"spotting\" (a little blood on the toilet tissue) for a day or two after the exam. This is normal.  - If you experience any rectal bleeding (not spotting), persistent tenderness or sharp severe abdominal pains, oral temperature over 100 degrees Fahrenheit, light-headedness or dizziness, or any other problems, contact your doctor.    Gastroscopy:  - You may have a sore throat for 2-3 days following the exam. This is normal. Gargling with warm salt water (1/2 tsp salt to 1 glass warm water) or using throat lozenges will help.  - If you experience any sharp pain in your neck, abdomen or chest, vomiting of blood, oral temperature over 100 degrees Fahrenheit, light-headedness or dizziness, or any other problems, contact your doctor.    **If unable to reach your doctor, please go to the [facility] Emergency Room**    - Your referring physician will receive a full report of your examination.  - If you do not hear from your doctor's office within two weeks of your biopsy, please call them for your results.    You may be able to see your laboratory results in [employer] between 4 and 7 business days.  In some cases, your physician may not have viewed the results before they are released to [employer].  If you have questions regarding your results contact the physician who ordered the test/exam by phone or via [employer] by choosing \"Ask a Medical Question.\"

## 2024-06-05 NOTE — ANESTHESIA POSTPROCEDURE EVALUATION
Patient: Katie Colby    Procedure Summary       Date: 06/05/24 Room / Location: [facility] ENDOSCOPY 02 / [facility] ENDO    Anesthesia Start: 0730 Anesthesia Stop: 0804    Procedures:       COLONOSCOPY      ESOPHAGOGASTRODUODENOSCOPY (EGD) Diagnosis:       Abdominal pain, unspecified abdominal location      BRBPR (bright red blood per rectum)      Screening for colon cancer      (gastric erythema, hemorrhoids)    Surgeons: IZAIAH Cid MD Anesthesiologist: Ifeanyi Burr MD    Anesthesia Type: MAC ASA Status: 2            Anesthesia Type: MAC    Vitals Value Taken Time   /78 06/05/24 0803   Temp  06/05/24 0804   Pulse 75 06/05/24 0803   Resp 19 06/05/24 0803   SpO2 97 % 06/05/24 0803       [facility] AN Post Evaluation:   Patient Evaluated in PACU  Patient Participation: waiting for patient participation  Pain Management: adequate  Airway Patency:patent  Yes    Nausea/Vomiting: none  Cardiovascular Status: acceptable  Respiratory Status: acceptable and room air  Postoperative Hydration acceptable      Ifeanyi Burr MD  6/5/2024 8:04 AM

## 2024-06-05 NOTE — H&P
History & Physical Examination    Patient Name: Katie Colby  MRN: [id number]  CSN: [id number]  YOB: 1975    Diagnosis: Screening, ab pain, dyspepsia    Medications Prior to Admission   Medication Sig Dispense Refill Last Dose    [] amoxicillin clavulanate 875-125 MG Oral Tab Take 1 tablet by mouth 2 (two) times daily for 7 days. 14 tablet 0     [] ciprofloxacin-dexamethasone 0.3-0.1 % Otic Suspension Place 4 drops into the left ear 2 (two) times daily for 7 days. 1 each 0     PEG 3350-KCl-Na Bicarb-NaCl (TRILYTE) 420 g Oral Recon Soln Take prep as directed by gastro office. May substitute with Trilyte/generic equivalent if needed. 4000 mL 0     AZASITE 1 % Ophthalmic Solution         No current facility-administered medications for this encounter.       Allergies: No Known Allergies    History reviewed. No pertinent past medical history.  Past Surgical History:   Procedure Laterality Date    Anesth, section      X 3. pre eclampsia at end. no problems.      Family History   Problem Relation Age of Onset    Diabetes Neg     Glaucoma Neg     Macular degeneration Neg      Social History     Tobacco Use    Smoking status: Never    Smokeless tobacco: Never   Substance Use Topics    Alcohol use: Never         SYSTEM Check if Review is Normal Check if Physical Exam is Normal If not normal, please explain:   HEENT [X ] [ X]    NECK  [X ] [ X]    HEART [X ] [ X]    LUNGS [X ] [ X]    ABDOMEN [X ] [ X]    EXTREMITIES [X ] [ X]    OTHER        I have discussed the risks and benefits and alternatives of the procedure with the patient/family.  They understand and agree to proceed with plan of care.   I have reviewed the History and Physical done within the last 30 days.  Any changes noted above.    STARLA Cid MD  [facility] - Gastroenterology  2024  7:32 AM

## 2024-06-05 NOTE — OPERATIVE REPORT
ESOPHAGOGASTRODUODENOSCOPY (EGD) & COLONOSCOPY REPORT    Katie Colby     10/7/1975 Age 48 year old   PCP Trinity Clark MD Endoscopist Julia Cid MD     Date of procedure: 24    Procedure: EGD w/cold biopsy & Colonoscopy     Pre-operative diagnosis: Screening, dyspepsia, ab pain    Post-operative diagnosis: Gastric erythema, internal hemorrhoids    Medications: MAC    Withdrawal time: 15 minutes    Complications: none    Procedure: Informed consent was obtained from the patient after the risks of the procedure were discussed, including but not limited to bleeding, perforation, aspiration, infection, or possibility of a missed lesion. We discussed the risks/benefits and alternatives to this procedure, as well as the planned sedation. EGD procedure: The patient was placed in the left lateral decubitus position and begun on continuous blood pressure pulse oximetry and EKG monitoring and this was maintained throughout the procedure. Once an adequate level of sedation was obtained a bite block was placed. Then the lubricated tip of the [id number] diagnostic video upper endoscope was inserted and advanced using direct visualization into the posterior pharynx and ultimately into the esophagus. Colonoscopy procedure: Once an adequate level of sedation was obtained a digital rectal exam was completed. Then the lubricated tip of the [id number] diagnostic video colonoscope was inserted and advanced without difficulty to the cecum using the CO2 insufflation technique. The cecum was identified by localizing the trifold, the appendix and the ileocecal valve. A routine second examination of the cecum/ascending colon was performed. Withdrawal was begun with thorough washing and careful examination of the colonic walls and folds. Photodocumentation was obtained. The bowel prep was good. Views of the colon were good with washing. I then carefully withdrew the instrument from the  patient who tolerated the procedure well.     Complications: None    EGD findings:      1. Esophagus: The squamocolumnar junction was noted at 37 cm and appeared regular. The diaphragmatic pinch was noted noted at 37 cm from the incisors. The esophageal mucosa appeared normal. There was no evidence of esophagitis, stricture or endoscopic evidence of Crisostomo's esophagus.  2. Stomach: The stomach distended normally. Normal rugal folds were seen. The pylorus was patent. The gastric mucosa appeared mildly erythematous s/p random gastric biopsies. Retroflexion revealed a normal fundus and a non-patulous cardia.   3. Duodenum: The duodenal mucosa appeared normal in the 1st and 2nd portion of the duodenum. S/p random biopsies.    Colonoscopy findings:    1. No polyp(s) noted.  2. Diverticulosis: none.  3. Terminal ileum: the visualized mucosa appeared normal.  4. The colonic mucosa throughout the colon showed normal vascular pattern, without evidence of angioectasias or inflammation.   5. A retroflexed view of the rectum revealed small internal hemorrhoids.  6. HEMANTH: normal rectal tone, no masses palpated.     Impression:  EGD shows no mass or ulcer, mild gastric erythema.  Normal colonoscopy to the terminal ileum, other than small internal hemorrhoids, which can cause rectal bleeding.     Recommend:  Await pathology. Repeat CLN in 10 years. If new signs or symptoms develop, colonoscopy may need to be repeated sooner.   High fiber diet.  Monitor for blood in the stool. If having more than just tinge of blood, call office or go to the ER.  Avoid caffeine, chocolate, peppermint, and alcohol. Also avoid lying down flat or in a recumbent position for 3 hours after a meal.     >>>If tissue was obtained and you have not received your pathology results either by phone or letter within 2 weeks, please call our office at [phone].    Specimens: gastric, duodenal  Blood loss: <1 ml

## 2024-06-05 NOTE — ANESTHESIA PREPROCEDURE EVALUATION
Anesthesia PreOp Note    HPI:     Katie Colby is a 48 year old female who presents for preoperative consultation requested by: IZAIAH Cid MD    Date of Surgery: 2024    Procedure(s):  COLONOSCOPY  ESOPHAGOGASTRODUODENOSCOPY (EGD)  Indication: Abdominal pain, unspecified abdominal location/ BRBPR (bright red blood per rectum)/ Screening for colon cancer    Relevant Problems   No relevant active problems       NPO:  Last Liquid Consumption Date: 24  Last Liquid Consumption Time: 033  Last Solid Consumption Date: 24  Last Solid Consumption Time: 2100  Last Liquid Consumption Date: 24          History Review:  Patient Active Problem List    Diagnosis Date Noted    Squamous blepharitis of upper eyelids of both eyes 2023    Dry eye syndrome of lacrimal gland, left 2023    Peripheral pterygium, stationary, left 2023    Age-related nuclear cataract of both eyes 2023    Vaccine counseling 2022    Mixed hyperlipidemia 07/15/2022    Colon cancer screening 2022    Adult general medical examination 2022    Pap smear for cervical cancer screening 2022    Encounter for screening mammogram for breast cancer 2022       History reviewed. No pertinent past medical history.    Past Surgical History:   Procedure Laterality Date    Anesth, section      X 3. pre eclampsia at end. no problems.        Medications Prior to Admission   Medication Sig Dispense Refill Last Dose    [] amoxicillin clavulanate 875-125 MG Oral Tab Take 1 tablet by mouth 2 (two) times daily for 7 days. 14 tablet 0     [] ciprofloxacin-dexamethasone 0.3-0.1 % Otic Suspension Place 4 drops into the left ear 2 (two) times daily for 7 days. 1 each 0     PEG 3350-KCl-Na Bicarb-NaCl (TRILYTE) 420 g Oral Recon Soln Take prep as directed by gastro office. May substitute with Trilyte/generic equivalent if needed. 4000 mL 0     AZASITE 1 % Ophthalmic  Solution         No current Epic-ordered facility-administered medications on file.     No current [facility]-ordered outpatient medications on file.       No Known Allergies    Family History   Problem Relation Age of Onset    Diabetes Neg     Glaucoma Neg     Macular degeneration Neg      Social History     Socioeconomic History    Marital status:    Occupational History    Occupation: works in metal    Tobacco Use    Smoking status: Never    Smokeless tobacco: Never   Vaping Use    Vaping status: Never Used   Substance and Sexual Activity    Alcohol use: Never    Drug use: Never       Available pre-op labs reviewed.  Lab Results   Component Value Date    URINEPREG Negative 06/05/2024             Vital Signs:  Body mass index is 27.96 kg/m².   height is 1.549 m (5' 1\") and weight is 67.1 kg (148 lb). Her blood pressure is 146/101 (abnormal) and her pulse is 75. Her respiration is 16 and oxygen saturation is 96%.   Vitals:    06/03/24 1029 06/05/24 0700   BP:  (!) 146/101   Pulse:  75   Resp:  16   SpO2:  96%   Weight: 67.1 kg (148 lb)    Height: 1.549 m (5' 1\")         Anesthesia Evaluation     Patient summary reviewed and Nursing notes reviewed    No history of anesthetic complications   Airway   Mallampati: II  TM distance: >3 FB  Neck ROM: full  Dental - Dentition appears grossly intact     Pulmonary - negative ROS and normal exam   Cardiovascular - normal exam  Exercise tolerance: good    ROS comment: HLD    Neuro/Psych - negative ROS     GI/Hepatic/Renal - negative ROS     Endo/Other - negative ROS   Abdominal                  Anesthesia Plan:   ASA:  2  Plan:   MAC  Plan Comments: I have discussed the anesthetic plan, major risks and alternatives with the patient and answered all questions. The patient desires to proceed with surgery and anesthesia as planned.     Informed Consent Plan and Risks Discussed With:  Patient      I have informed Katie Colby and/or legal guardian or family member  of the nature of the anesthetic plan, benefits, risks including possible dental damage if relevant, major complications, and any alternative forms of anesthetic management.   All of the patient's questions were answered to the best of my ability. The patient desires the anesthetic management as planned.  Ifeanyi Burr MD  6/5/2024 7:14 AM  Present on Admission:  **None**

## 2024-06-27 ENCOUNTER — TELEPHONE (OUTPATIENT)
Dept: GASTROENTEROLOGY | Facility: CLINIC | Age: 49
End: 2024-06-27

## 2024-06-27 NOTE — TELEPHONE ENCOUNTER
I mailed out colonoscopy/EGD results letter to pt  Updated health maintenance  Entered into 10 yr CLN  recall  Recall colon in 10 years per. Colon done 6/05/2024  EGD done 6/05/2024-No EGD recall entered.     IZAIAH Cid MD  [facility] Clinical Staff  GI staff: please place recall in for colonoscopy in 10 years

## 2024-07-11 ENCOUNTER — OFFICE VISIT (OUTPATIENT)
Dept: INTERNAL MEDICINE CLINIC | Facility: CLINIC | Age: 49
End: 2024-07-11

## 2024-07-11 VITALS
BODY MASS INDEX: 25.49 KG/M2 | TEMPERATURE: 98 F | RESPIRATION RATE: 16 BRPM | HEART RATE: 75 BPM | OXYGEN SATURATION: 100 % | WEIGHT: 135 LBS | SYSTOLIC BLOOD PRESSURE: 122 MMHG | DIASTOLIC BLOOD PRESSURE: 74 MMHG | HEIGHT: 61 IN

## 2024-07-11 DIAGNOSIS — Z63.0 MARITAL/PARTNER RELATIONAL PROBLEM: Primary | ICD-10-CM

## 2024-07-11 PROCEDURE — 3008F BODY MASS INDEX DOCD: CPT | Performed by: INTERNAL MEDICINE

## 2024-07-11 PROCEDURE — 3078F DIAST BP <80 MM HG: CPT | Performed by: INTERNAL MEDICINE

## 2024-07-11 PROCEDURE — 99213 OFFICE O/P EST LOW 20 MIN: CPT | Performed by: INTERNAL MEDICINE

## 2024-07-11 PROCEDURE — 3074F SYST BP LT 130 MM HG: CPT | Performed by: INTERNAL MEDICINE

## 2024-07-11 SDOH — SOCIAL STABILITY - SOCIAL INSECURITY: PROBLEMS IN RELATIONSHIP WITH SPOUSE OR PARTNER: Z63.0

## 2024-07-11 NOTE — PROGRESS NOTES
Subjective:   Patient ID: Katie Colby is a 48 year old female.    HPI  Patient comes in today stating that she has been having problem in the marriage and would like to see a psychologist or someone that she can talk to about his problems denies any suicidal homicidal thoughts no depression some anxiety.  History/Other:   Review of Systems   Constitutional: Negative.    HENT: Negative.     Eyes: Negative.    Respiratory: Negative.     Cardiovascular: Negative.    Gastrointestinal: Negative.    Genitourinary: Negative.    Musculoskeletal: Negative.    Skin: Negative.    Neurological: Negative.    Psychiatric/Behavioral: Negative.          Marital problems      Current Outpatient Medications   Medication Sig Dispense Refill    AZASITE 1 % Ophthalmic Solution  (Patient not taking: Reported on 7/11/2024)       Allergies:No Known Allergies    Objective:   Physical Exam  Vitals and nursing note reviewed.   Constitutional:       Appearance: She is well-developed.   HENT:      Head: Normocephalic and atraumatic.      Right Ear: External ear normal.      Left Ear: External ear normal.      Nose: Nose normal.   Eyes:      Conjunctiva/sclera: Conjunctivae normal.      Pupils: Pupils are equal, round, and reactive to light.   Cardiovascular:      Rate and Rhythm: Normal rate and regular rhythm.      Heart sounds: Normal heart sounds.   Pulmonary:      Effort: Pulmonary effort is normal.      Breath sounds: Normal breath sounds.   Abdominal:      General: Bowel sounds are normal.      Palpations: Abdomen is soft.   Genitourinary:     Vagina: Normal.   Musculoskeletal:         General: Normal range of motion.      Cervical back: Normal range of motion and neck supple.   Skin:     General: Skin is warm and dry.   Neurological:      Mental Status: She is alert and oriented to person, place, and time.      Deep Tendon Reflexes: Reflexes are normal and symmetric.   Psychiatric:         Behavior: Behavior normal.          Thought Content: Thought content normal.         Judgment: Judgment normal.         Assessment & Plan:   1. Marital/partner relational problem will refer to [facility] any symptoms of depression and worsening anxiety let us know       No orders of the defined types were placed in this encounter.      Meds This Visit:  Requested Prescriptions      No prescriptions requested or ordered in this encounter       Imaging & Referrals:   NAVIGATOR

## 2024-07-16 ENCOUNTER — TELEPHONE (OUTPATIENT)
Age: 49
End: 2024-07-16

## 2024-07-16 NOTE — TELEPHONE ENCOUNTER
Denis Wilson - Me comunico con el departamento de Navegación de [facility], dando seguimiento a melchor orden del consultorio de aguero proveedor para ayudarlo a conectarse con recursos para aguero atención. Si desea hablar más sobre esto, llámenos al [phone] o, para obtener asistencia más inmediata, puede comunicarse con nuestra línea de ayuda las 24 horas al [phone]. Esperamos tener noticias suyas pronto.

## 2024-07-24 ENCOUNTER — TELEPHONE (OUTPATIENT)
Age: 49
End: 2024-07-24

## 2024-07-24 NOTE — TELEPHONE ENCOUNTER
Denis,    El equipo de [facility] ha intentado comunicarse con usted en relación con melchor orden del consultorio del Dr. Bradley. Nos comunicamos con usted para ayudarlo a coordinar la atención y los recursos que puedan satisfacer jessie necesidades. Llame a nuestra oficina al [phone]. Para obtener asistencia más inmediata, puede comunicarse con nuestra línea de ayuda las 24 horas al [phone]. Esperamos tener noticias suyas pronto.

## 2024-12-17 ENCOUNTER — HOSPITAL ENCOUNTER (OUTPATIENT)
Dept: GENERAL RADIOLOGY | Facility: HOSPITAL | Age: 49
Discharge: HOME | End: 2024-12-17
Attending: INTERNAL MEDICINE
Payer: COMMERCIAL

## 2024-12-17 ENCOUNTER — OFFICE VISIT (OUTPATIENT)
Dept: INTERNAL MEDICINE CLINIC | Facility: CLINIC | Age: 49
End: 2024-12-17
Payer: COMMERCIAL

## 2024-12-17 VITALS
SYSTOLIC BLOOD PRESSURE: 118 MMHG | TEMPERATURE: 98 F | OXYGEN SATURATION: 99 % | HEART RATE: 78 BPM | DIASTOLIC BLOOD PRESSURE: 78 MMHG | HEIGHT: 61 IN | RESPIRATION RATE: 17 BRPM | WEIGHT: 138 LBS | BODY MASS INDEX: 26.06 KG/M2

## 2024-12-17 DIAGNOSIS — M79.642 BILATERAL HAND PAIN: Primary | ICD-10-CM

## 2024-12-17 DIAGNOSIS — M79.641 BILATERAL HAND PAIN: ICD-10-CM

## 2024-12-17 DIAGNOSIS — M79.641 BILATERAL HAND PAIN: Primary | ICD-10-CM

## 2024-12-17 DIAGNOSIS — M79.642 BILATERAL HAND PAIN: ICD-10-CM

## 2024-12-17 PROCEDURE — 73130 X-RAY EXAM OF HAND: CPT | Performed by: INTERNAL MEDICINE

## 2024-12-17 PROCEDURE — 3074F SYST BP LT 130 MM HG: CPT | Performed by: INTERNAL MEDICINE

## 2024-12-17 PROCEDURE — 3008F BODY MASS INDEX DOCD: CPT | Performed by: INTERNAL MEDICINE

## 2024-12-17 PROCEDURE — 3078F DIAST BP <80 MM HG: CPT | Performed by: INTERNAL MEDICINE

## 2024-12-17 PROCEDURE — 99214 OFFICE O/P EST MOD 30 MIN: CPT | Performed by: INTERNAL MEDICINE

## 2024-12-17 RX ORDER — MELOXICAM 7.5 MG/1
7.5 TABLET ORAL DAILY PRN
Qty: 30 TABLET | Refills: 0 | Status: SHIPPED | OUTPATIENT
Start: 2024-12-17

## 2024-12-17 NOTE — PROGRESS NOTES
Subjective:     Patient ID: Katie Colby is a 49 year old female.    HPI  Patient comes in today with complaint of bilateral hand pain left more than right usually the index and the ring finger on both hands she works as a cleaning lady pain every day almost for the last 3 months no injury that she remembers but looking at her x-rays from last year there is a old ulnar and radial fracture on the left arm distally but patient not aware of this  History/Other:   Review of Systems   Constitutional: Negative.    HENT: Negative.     Eyes: Negative.    Respiratory: Negative.     Cardiovascular: Negative.    Gastrointestinal: Negative.    Genitourinary: Negative.    Musculoskeletal:  Positive for arthralgias.        Bilateral hand pain   Skin: Negative.    Neurological: Negative.    Psychiatric/Behavioral: Negative.       Current Outpatient Medications   Medication Sig Dispense Refill    Meloxicam 7.5 MG Oral Tab Take 1 tablet (7.5 mg total) by mouth daily as needed for Pain. 30 tablet 0    AZASITE 1 % Ophthalmic Solution  (Patient not taking: Reported on 2024)       Allergies:Allergies[1]    Past Medical History:    Screen for colon cancer    repeat CLN in 10 years      Past Surgical History:   Procedure Laterality Date    Anesth, section      X 3. pre eclampsia at end. no problems.     Colonoscopy  2024    Dr. Cid; hemorrhoids    Colonoscopy N/A 2024    Procedure: COLONOSCOPY;  Surgeon: IZAIAH Cid MD;  Location: [facility] ENDO    Egd  2024    Dr. Cid; gastric erythema      Family History   Problem Relation Age of Onset    Diabetes Neg     Glaucoma Neg     Macular degeneration Neg       Social History:   Social History     Socioeconomic History    Marital status:    Occupational History    Occupation: works in metal    Tobacco Use    Smoking status: Never    Smokeless tobacco: Never   Vaping Use    Vaping status: Never Used   Substance and Sexual Activity     Alcohol use: Never    Drug use: Never        Objective:   Physical Exam  Vitals and nursing note reviewed.   Constitutional:       Appearance: She is well-developed.   HENT:      Head: Normocephalic and atraumatic.      Right Ear: External ear normal.      Left Ear: External ear normal.      Nose: Nose normal.   Eyes:      Conjunctiva/sclera: Conjunctivae normal.      Pupils: Pupils are equal, round, and reactive to light.   Cardiovascular:      Rate and Rhythm: Normal rate and regular rhythm.      Heart sounds: Normal heart sounds.   Pulmonary:      Effort: Pulmonary effort is normal.      Breath sounds: Normal breath sounds.   Abdominal:      General: Bowel sounds are normal.      Palpations: Abdomen is soft.   Genitourinary:     Vagina: Normal.   Musculoskeletal:         General: Tenderness present. Normal range of motion.      Cervical back: Normal range of motion and neck supple.      Comments: Bilateral hand pain index and ring finger on both hands in the palm area   Skin:     General: Skin is warm and dry.   Neurological:      Mental Status: She is alert and oriented to person, place, and time.      Deep Tendon Reflexes: Reflexes are normal and symmetric.   Psychiatric:         Behavior: Behavior normal.         Thought Content: Thought content normal.         Judgment: Judgment normal.         Assessment & Plan:   1. Bilateral hand pain will get an x-ray will give Mobic to take as needed for pain follow-up with hand specialist        No orders of the defined types were placed in this encounter.      Meds This Visit:  Requested Prescriptions     Signed Prescriptions Disp Refills    Meloxicam 7.5 MG Oral Tab 30 tablet 0     Sig: Take 1 tablet (7.5 mg total) by mouth daily as needed for Pain.       Imaging & Referrals:  PLASTIC SURGERY - INTERNAL  XR HAND BILAT (MIN 3 VIEWS) (CPT=73130-50)            [1] No Known Allergies

## 2025-01-06 ENCOUNTER — OFFICE VISIT (OUTPATIENT)
Dept: SURGERY | Facility: CLINIC | Age: 50
End: 2025-01-06

## 2025-01-06 VITALS — SYSTOLIC BLOOD PRESSURE: 144 MMHG | DIASTOLIC BLOOD PRESSURE: 84 MMHG | RESPIRATION RATE: 18 BRPM | HEART RATE: 78 BPM

## 2025-01-06 DIAGNOSIS — M65.341 TRIGGER RING FINGER OF RIGHT HAND: ICD-10-CM

## 2025-01-06 DIAGNOSIS — M65.342 TRIGGER RING FINGER OF LEFT HAND: ICD-10-CM

## 2025-01-06 DIAGNOSIS — M65.322 TRIGGER INDEX FINGER OF LEFT HAND: ICD-10-CM

## 2025-01-06 DIAGNOSIS — M65.321 TRIGGER INDEX FINGER OF RIGHT HAND: Primary | ICD-10-CM

## 2025-01-06 RX ORDER — BETAMETHASONE SODIUM PHOSPHATE AND BETAMETHASONE ACETATE 3; 3 MG/ML; MG/ML
12 INJECTION, SUSPENSION INTRA-ARTICULAR; INTRALESIONAL; INTRAMUSCULAR; SOFT TISSUE ONCE
Status: COMPLETED | OUTPATIENT
Start: 2025-01-06 | End: 2025-01-06

## 2025-01-06 RX ADMIN — BETAMETHASONE SODIUM PHOSPHATE AND BETAMETHASONE ACETATE 12 MG: 3; 3 INJECTION, SUSPENSION INTRA-ARTICULAR; INTRALESIONAL; INTRAMUSCULAR; SOFT TISSUE at 11:57:00

## 2025-01-06 NOTE — H&P
Katie Colby is a 49 year old female that presents with   Chief Complaint   Patient presents with    Pain     Trigger LIF, LRF, RIF, RRF   .    REFERRED BY:  [employer] Bradley    Pacemaker: No  Latex Allergy: no  Coumadin: No  Plavix: No  Other anticoagulants: No  Diet medication: No  Cardiac stents: No    HAND DOMINANCE:  Right    Profession: clean houses    RECONSTRUCTIVE HISTORY    SUN EXPOSURE   Current no   Past no   Sunburns no   Tanning salons current no   Tanning salons past no     SKIN CANCER    Personal history of skin cancer: none      HPI:       Language line: Mela translating    49-year-old female right-hand-dominant with bilateral index and ring finger triggers    6 months duration    Pain with triggering and locking    Meloxicam did not help    Left hand is much worse than the right      Review of Systems:   Constitutional: No change in appetite, chill/rigors, or fatigue  GI: No jaundice  Endocrine: No generalized weakness  Neurological: No aphasia, loss of consciousness, or seizures    Musculoskeletal:    TRIGGER    Left    index finger ,left ring finger  Right index finger and right ring finger    Duration:   6 months      PAIN  Yes 8/10  TRIGGER Yes  LOCKING Yes    PREVIOUS TREATMENT Anti-inflammatories meloxicam     Treatment helped?  no        PMH:     MEDICAL  Past Medical History:    Screen for colon cancer    repeat CLN in 10 years        SURGICAL  Past Surgical History:   Procedure Laterality Date    Anesth, section      X 3. pre eclampsia at end. no problems.     Colonoscopy  2024    Dr. Cid; hemorrhoids    Colonoscopy N/A 2024    Procedure: COLONOSCOPY;  Surgeon: IZAIAH Cid MD;  Location: [facility] ENDO    Egd  2024    Dr. Cid; gastric erythema        ALLERIGIES  Allergies[1]     MEDICATIONS  Current Outpatient Medications   Medication Sig Dispense Refill    Meloxicam 7.5 MG Oral Tab Take 1 tablet (7.5 mg total) by mouth daily as needed for Pain.  30 tablet 0        SOCIAL HISTORY  Social History     Socioeconomic History    Marital status:    Occupational History    Occupation: works in metal    Tobacco Use    Smoking status: Never    Smokeless tobacco: Never   Vaping Use    Vaping status: Never Used   Substance and Sexual Activity    Alcohol use: Never    Drug use: Never   Other Topics Concern    Right Handed Yes        FAMILY HISTORY  Family History   Problem Relation Age of Onset    Diabetes Neg     Glaucoma Neg     Macular degeneration Neg           PHYSICAL EXAM:     CONSTITUTIONAL: Overall appearance - Normal  HEENT: Normocephalic  EYES: Conjunctiva - Right: Normal, Left: Normal; EOMI  EARS: Inspection - Right: Normal, Left: Normal  NECK/THYROID: Inspection - Normal, Palpation - Normal, Thyroid gland - Normal, No adenopathy  RESPIRATORY: Inspection - Normal, Effort - Normal  CARDIOVASCULAR: Regular rhythm, No murmurs  ABDOMEN: Inspection - Normal, No abdominal tenderness  NEURO: Memory intact  PSYCH: Oriented to person, place, time, and situation, Appropriate mood and affect      Hand Physical Exam:       LIF/LRF/RIF/RRF triggering and locking  LIF/LRF pain with MP tenderness        ASSESSMENT/PLAN:       TRIGGER DIGIT LIF/LRF/RIF/RRF          We discussed what a TRIGGER DIGIT is, including treatment options.  Questions were answered and the patient wishes to proceed with treatment.     This is minimally symptomatic.  No therapy is indicated at this point.  If symptoms worsen, it should be reevaluated.  RIGHT HAND      I would recommend steroid injection.  This has an approximately 50% chance of symptom improvement.  If there is little or no relief with the injection, surgery may be necessary.  There may be recurrence after injection, necessitating another injection or surgery.  The entire digit may be numb for one to two hours after the injection.  It may take several days for the injection to take effect.  If symptoms persist or triggering is  still bothersome, the patient will make an appointment.  LEFT HAND    INJECTION PROCEDURE NOTE     Procedure performed by: Tomas Khan MD    Date: 1/6/2025    Procedural time-out required/obtained? Yes    PROCEDURE DETAILS:  Informed consent was obtained.    Questions answered and the patient wishes to proceed with treatment.  The areas were cleaned using aseptic technique.   Location: left index finger  ring finger  Injected with: Celestone 6 mg/Lidocaine 1% injection (2cc total) x 2  Estimated blood loss: None  The patient tolerated the procedure well.  Plan: Follow up as directed.             1/6/2025  Tomas Khan MD           +++++++++++++++++++++++++++++++++++++++++      MEDICAL DECISION MAKING    PROBLEMS      MODERATE    (number / complexity)          Undiagnosed new illness with uncertain prognosis, progression    DATA         STRAIGHTFORWARD    (amount / complexity)           MANAGEMENT RISK  MODERATE    (complications/ morbidity)       Steroid injection                  MDM LEVEL    MODERATE            [1] No Known Allergies

## 2025-01-06 NOTE — PROGRESS NOTES
After evaluation by Dr. Khan, orders verified for 2cc celestone injection(s) to left index finger and left ring finger  1cc/6mg Betamethasone and 1cc/10mg 1% Lidocaine drawn up into one syringe for injection(s).  Consent obtained and witnessed, and time-out performed by RN.  Pt's vitals and pain score pre-and post-injection recorded in vitals section.  Pt reclined in exam chair and armboard placed into position for injection(s).  Pt tolerated procedure well and band-aid(s) applied.  Pt exam chair returned to sitting position and pt left office in satisfactory condition.  Pt will call in one month if pain continues or worsens.  Pt instructed to call the office w/any further questions and/or concerns.

## 2025-04-02 ENCOUNTER — OFFICE VISIT (OUTPATIENT)
Dept: OBGYN CLINIC | Facility: CLINIC | Age: 50
End: 2025-04-02

## 2025-04-02 VITALS
SYSTOLIC BLOOD PRESSURE: 147 MMHG | BODY MASS INDEX: 26 KG/M2 | WEIGHT: 136 LBS | HEART RATE: 80 BPM | DIASTOLIC BLOOD PRESSURE: 96 MMHG

## 2025-04-02 DIAGNOSIS — N75.0 BARTHOLIN CYST: Primary | ICD-10-CM

## 2025-04-02 PROCEDURE — 99203 OFFICE O/P NEW LOW 30 MIN: CPT | Performed by: STUDENT IN AN ORGANIZED HEALTH CARE EDUCATION/TRAINING PROGRAM

## 2025-04-02 PROCEDURE — 3080F DIAST BP >= 90 MM HG: CPT | Performed by: STUDENT IN AN ORGANIZED HEALTH CARE EDUCATION/TRAINING PROGRAM

## 2025-04-02 PROCEDURE — 3077F SYST BP >= 140 MM HG: CPT | Performed by: STUDENT IN AN ORGANIZED HEALTH CARE EDUCATION/TRAINING PROGRAM

## 2025-04-02 NOTE — PROGRESS NOTES
[facility]  Obstetrics and Gynecology  Gyne Problem Visit      Katie Colby is a 49 year old female  is a new patient to me presenting with a lump noted on the left labial about 4 days ago. Admitted to shaving last Saturday which was when she felt like labia was inflamed. Denies any tenderness. Feels like it has gotten smaller since then. Denies any drainage. Sexually active with same partner. Denies any known history HSV. No fevers, chills, or body aches. Denies any abnormal vaginal discharge, odor, irritation, or itching. No OTC treatments.     Patient's last menstrual period was 2025 (exact date).       OBSTETRICS HISTORY:  OB History    Para Term  AB Living   3 3 0 0 0 0   SAB IAB Ectopic Multiple Live Births   0 0 0 0 0       GYNE HISTORY:  Hx Prior Abnormal Pap: No   Menarche: 10y/o (2025  1:19 PM)  Period Cycle (Days): Irregular (2025  1:19 PM)  Hx Prior Abnormal Pap: No (2025  1:19 PM)         No data to display                  History   Sexual Activity    Sexual activity: Not on file       MEDICAL HISTORY:  Past Medical History:   Diagnosis Date    Screen for colon cancer     repeat CLN in 10 years     Past Surgical History:   Procedure Laterality Date    Anesth, section      X 3. pre eclampsia at end. no problems.     Colonoscopy  2024    Dr. Cid; hemorrhoids    Colonoscopy N/A 2024    Procedure: COLONOSCOPY;  Surgeon: IZAIAH Cid MD;  Location: [facility] ENDO    Egd  2024    Dr. Cid; gastric erythema       SOCIAL HISTORY:  Social History     Socioeconomic History    Marital status:      Spouse name: Not on file    Number of children: Not on file    Years of education: Not on file    Highest education level: Not on file   Occupational History    Occupation: works in metal    Tobacco Use    Smoking status: Never    Smokeless tobacco: Never   Vaping Use    Vaping status: Never Used   Substance and Sexual Activity    Alcohol  use: Never    Drug use: Never    Sexual activity: Not on file   Other Topics Concern    Left Handed Not Asked    Right Handed Yes    Currently spends a great deal of time in the sun Not Asked    Past Sunlamp Treatments for Acne Not Asked    History of tanning Not Asked    Hx of Spending Great Deal of Time in Sun Not Asked    Bad sunburns in the past Not Asked    Tanning Salons in the Past Not Asked    Hx of Radiation Treatments Not Asked    Regular use of sun block Not Asked   Social History Narrative    Not on file     Social Drivers of Health     Food Insecurity: Not on file   Transportation Needs: Not on file   Stress: Not on file   Housing Stability: Not on file       MEDICATIONS:    Current Outpatient Medications:     Meloxicam 7.5 MG Oral Tab, Take 1 tablet (7.5 mg total) by mouth daily as needed for Pain. (Patient not taking: Reported on 4/2/2025), Disp: 30 tablet, Rfl: 0    ALLERGIES:  Allergies[1]      REVIEW OF SYSTEMS:  Review of Systems   Constitutional:  Negative for chills, fever and unexpected weight change.   Respiratory: Negative.     Cardiovascular: Negative.    Gastrointestinal:  Negative for abdominal pain, constipation, diarrhea and nausea.   Genitourinary:  Negative for dyspareunia, dysuria, genital sores, hematuria, menstrual problem, pelvic pain, vaginal bleeding, vaginal discharge and vaginal pain.        Labial swelling   Musculoskeletal: Negative.    Skin: Negative.    Neurological: Negative.    Hematological: Negative.    Psychiatric/Behavioral: Negative.         PHYSICAL EXAM:  BP (!) 147/96   Pulse 80   Wt 136 lb (61.7 kg)   LMP 03/02/2025 (Exact Date)   BMI 25.70 kg/m²     Chaperone offered, pt declined.    GENERAL: well developed, well nourished, in no apparent distress  ABDOMEN: Soft, non distended; non tender, no masses  GYNE/: External Genitalia: Normal appearing, no lesions. Urethral meatus appear wnl, no abnormal discharge or lesions noted.          Bladder: well supported,  urethra wnl, no lesions or fissures                     Vagina: 2 cm left sided bartholin cyst noted without erythema or tenderness. Otherwise normal pink mucosa, no lesions, normal clear discharge.                      Uterus:  mobile, non tender, normal size                     Cervix: Normal                      Adnexa: non tender, no masses, normal size     ASSESSMENT:       ICD-10-CM    1. Bartholin cyst  N75.0           Plan:  - Findings consistent with bartholin cyst. Patient has not had previous episodes. Does not appear to be infected. Will recommend warm compresses or soaks for the next 7 days. RTC in 1 week, if no improvement will then discuss possible word catheter placement. If she develops fevers or notices enlarging of lesion or tenderness she is to notify us immediately.      LUISITO BOYLE PA-C  1:37 PM  4/2/2025        Spent total time 20 minutes on obtaining history / chart review, evaluating patient / performing medically appropriate exam, discussing treatment options, counseling / educating, and completing documentation, coordinating care.           [1] No Known Allergies

## 2025-04-03 ENCOUNTER — OFFICE VISIT (OUTPATIENT)
Dept: FAMILY MEDICINE CLINIC | Facility: CLINIC | Age: 50
End: 2025-04-03

## 2025-04-03 VITALS
WEIGHT: 141.19 LBS | BODY MASS INDEX: 26.66 KG/M2 | HEIGHT: 61 IN | DIASTOLIC BLOOD PRESSURE: 85 MMHG | SYSTOLIC BLOOD PRESSURE: 130 MMHG | OXYGEN SATURATION: 95 % | HEART RATE: 75 BPM

## 2025-04-03 DIAGNOSIS — Z12.31 BREAST CANCER SCREENING BY MAMMOGRAM: ICD-10-CM

## 2025-04-03 DIAGNOSIS — R42 DIZZINESS: Primary | ICD-10-CM

## 2025-04-03 DIAGNOSIS — R09.81 HEAD CONGESTION: ICD-10-CM

## 2025-04-03 DIAGNOSIS — J31.0 RHINITIS, UNSPECIFIED TYPE: ICD-10-CM

## 2025-04-03 DIAGNOSIS — R03.0 ELEVATED BLOOD PRESSURE READING: ICD-10-CM

## 2025-04-03 PROCEDURE — 3008F BODY MASS INDEX DOCD: CPT

## 2025-04-03 PROCEDURE — 99213 OFFICE O/P EST LOW 20 MIN: CPT

## 2025-04-03 PROCEDURE — 3079F DIAST BP 80-89 MM HG: CPT

## 2025-04-03 PROCEDURE — 3075F SYST BP GE 130 - 139MM HG: CPT

## 2025-04-03 RX ORDER — FLUTICASONE PROPIONATE 50 MCG
2 SPRAY, SUSPENSION (ML) NASAL DAILY
Qty: 9.9 ML | Refills: 0 | Status: SHIPPED | OUTPATIENT
Start: 2025-04-03 | End: 2026-03-29

## 2025-04-03 RX ORDER — LORATADINE AND PSEUDOEPHEDRINE SULFATE 5; 120 MG/1; MG/1
1 TABLET, EXTENDED RELEASE ORAL 2 TIMES DAILY
Qty: 20 TABLET | Refills: 0 | Status: SHIPPED | OUTPATIENT
Start: 2025-04-03 | End: 2025-04-13

## 2025-04-03 NOTE — PROGRESS NOTES
Subjective:   Katie Colby is a 49 year old female who presents for Dizziness (Past 1 wk, ) and Blood Pressure (Was high w/ gyno yesterday  147/92 )   Patient is a pleasant 49-year-old female with no significant past medical history.  Patient presents to office today new to this provider typically following with internal medicine to be evaluated for dizziness for the past week. When she wakes up in the morning she has dizziness. It feels like she is falling. She has lots of pressure in her head. This last Friday she did have ringing in her ears as well and pressure/pain in her ears. It has gotten better as of late. She has not fallen. She was seeing gyne yesterday and she was nervous. Her blood pressure was higher. She was told to follow up.     Patient states via Michael [id number]        Past Medical History:    Screen for colon cancer    repeat CLN in 10 years      Past Surgical History:   Procedure Laterality Date    Anesth, section      X 3. pre eclampsia at end. no problems.     Colonoscopy  2024    Dr. Cid; hemorrhoids    Colonoscopy N/A 2024    Procedure: COLONOSCOPY;  Surgeon: IZAIAH Cid MD;  Location: [facility] ENDO    Egd  2024    Dr. Cid; gastric erythema        History/Other:    Chief Complaint Reviewed and Verified  Nursing Notes Reviewed and   Verified  Tobacco Reviewed  Allergies Reviewed  Medications Reviewed    Problem List Reviewed  Medical History Reviewed  Surgical History   Reviewed  OB Status Reviewed  Family History Reviewed  Social History   Reviewed         Tobacco:  She has never smoked tobacco.    Current Outpatient Medications   Medication Sig Dispense Refill    loratadine-pseudoephedrine ER (CLARITIN-D 12 HOUR) 5-120 MG Oral Tablet 12 Hr Take 1 tablet by mouth 2 (two) times daily for 10 days. 20 tablet 0    fluticasone propionate 50 MCG/ACT Nasal Suspension 2 sprays by Each Nare route daily for 360 doses. 9.9 mL 0    Meloxicam 7.5 MG  Oral Tab Take 1 tablet (7.5 mg total) by mouth daily as needed for Pain. (Patient not taking: Reported on 4/3/2025) 30 tablet 0         Review of Systems:  Review of Systems   Constitutional:  Negative for chills and fever.   HENT:  Positive for congestion, ear pain and postnasal drip.    Respiratory:  Negative for cough and shortness of breath.    Cardiovascular:  Negative for chest pain.   Neurological:  Positive for dizziness.         Objective:   /85 (BP Location: Right arm, Patient Position: Sitting, Cuff Size: large)   Pulse 75   Ht 5' 1\" (1.549 m)   Wt 141 lb 3.2 oz (64 kg)   LMP 03/15/2025 (Exact Date)   SpO2 95%   BMI 26.68 kg/m²  Estimated body mass index is 26.68 kg/m² as calculated from the following:    Height as of this encounter: 5' 1\" (1.549 m).    Weight as of this encounter: 141 lb 3.2 oz (64 kg).  Physical Exam  Vitals and nursing note reviewed.   Constitutional:       Appearance: Normal appearance.   HENT:      Head: Normocephalic and atraumatic.      Right Ear: Ear canal and external ear normal. A middle ear effusion is present.      Left Ear: Ear canal and external ear normal. A middle ear effusion is present.      Nose: Congestion and rhinorrhea present.      Comments: Swollen shut     Mouth/Throat:      Mouth: Mucous membranes are moist.      Pharynx: Oropharynx is clear.      Comments: +pnd  Cardiovascular:      Rate and Rhythm: Normal rate and regular rhythm.      Pulses: Normal pulses.      Heart sounds: Normal heart sounds. No murmur heard.  Pulmonary:      Effort: Pulmonary effort is normal.      Breath sounds: Normal breath sounds.   Skin:     Capillary Refill: Capillary refill takes less than 2 seconds.   Neurological:      Mental Status: She is alert and oriented to person, place, and time.           Assessment & Plan:   1. Dizziness (Primary)  -     Claritin-D 12 Hour; Take 1 tablet by mouth 2 (two) times daily for 10 days.  Dispense: 20 tablet; Refill: 0  -      Fluticasone Propionate; 2 sprays by Each Nare route daily for 360 doses.  Dispense: 9.9 mL; Refill: 0  2. Rhinitis, unspecified type  -     Claritin-D 12 Hour; Take 1 tablet by mouth 2 (two) times daily for 10 days.  Dispense: 20 tablet; Refill: 0  -     Fluticasone Propionate; 2 sprays by Each Nare route daily for 360 doses.  Dispense: 9.9 mL; Refill: 0  3. Head congestion  -     Claritin-D 12 Hour; Take 1 tablet by mouth 2 (two) times daily for 10 days.  Dispense: 20 tablet; Refill: 0  -     Fluticasone Propionate; 2 sprays by Each Nare route daily for 360 doses.  Dispense: 9.9 mL; Refill: 0  4. Breast cancer screening by mammogram  -     [facility] DULCE 2D+3D SCREENING BILAT (CPT=77067/03768); Future; Expected date: 04/03/2025  5. Elevated blood pressure reading    1. Dizziness  Likely secondary to congestion and rhinitis  Effusion bilat ears  PND in throat  Start flonase  Start claritin d bid x 10 days  Referral to ENT as needed  - loratadine-pseudoephedrine ER (CLARITIN-D 12 HOUR) 5-120 MG Oral Tablet 12 Hr; Take 1 tablet by mouth 2 (two) times daily for 10 days.  Dispense: 20 tablet; Refill: 0  - fluticasone propionate 50 MCG/ACT Nasal Suspension; 2 sprays by Each Nare route daily for 360 doses.  Dispense: 9.9 mL; Refill: 0    2. Rhinitis, unspecified type  See above  - loratadine-pseudoephedrine ER (CLARITIN-D 12 HOUR) 5-120 MG Oral Tablet 12 Hr; Take 1 tablet by mouth 2 (two) times daily for 10 days.  Dispense: 20 tablet; Refill: 0  - fluticasone propionate 50 MCG/ACT Nasal Suspension; 2 sprays by Each Nare route daily for 360 doses.  Dispense: 9.9 mL; Refill: 0    3. Head congestion  Claritin D BID x 10 days  humidifer  - loratadine-pseudoephedrine ER (CLARITIN-D 12 HOUR) 5-120 MG Oral Tablet 12 Hr; Take 1 tablet by mouth 2 (two) times daily for 10 days.  Dispense: 20 tablet; Refill: 0  - fluticasone propionate 50 MCG/ACT Nasal Suspension; 2 sprays by Each Nare route daily for 360 doses.  Dispense: 9.9 mL; Refill:  0    4. Breast cancer screening by mammogram  Referral placed for mammogram  - [facility] DULCE 2D+3D SCREENING BILAT (CPT=77067/21159); Future    5. Elevated blood pressure reading  Normal in office  Was nervous at OB yesterday  Advised monitoring at home and notifying pcp if continually above 140/90  Advised scheduling physical     Patient aware of plan of care. All questions answered to satisfaction of the patient. Patient instructed to call office or reach out via [employer] if any issues arise. For urgent issues and/or reviewed red flags please proceed to the urgent care or ER.  Also, inform the nurse practitioner with any new symptoms or medication side effects.          Return for Annual physical.    MEY Dillard, 4/3/2025, 8:26 AM

## 2025-04-08 ENCOUNTER — OFFICE VISIT (OUTPATIENT)
Dept: OBGYN CLINIC | Facility: CLINIC | Age: 50
End: 2025-04-08

## 2025-04-08 VITALS
WEIGHT: 136 LBS | SYSTOLIC BLOOD PRESSURE: 138 MMHG | DIASTOLIC BLOOD PRESSURE: 87 MMHG | HEART RATE: 81 BPM | BODY MASS INDEX: 26 KG/M2

## 2025-04-08 DIAGNOSIS — N75.0 BARTHOLIN CYST: Primary | ICD-10-CM

## 2025-04-08 PROCEDURE — 3079F DIAST BP 80-89 MM HG: CPT | Performed by: STUDENT IN AN ORGANIZED HEALTH CARE EDUCATION/TRAINING PROGRAM

## 2025-04-08 PROCEDURE — 3075F SYST BP GE 130 - 139MM HG: CPT | Performed by: STUDENT IN AN ORGANIZED HEALTH CARE EDUCATION/TRAINING PROGRAM

## 2025-04-08 PROCEDURE — 99213 OFFICE O/P EST LOW 20 MIN: CPT | Performed by: STUDENT IN AN ORGANIZED HEALTH CARE EDUCATION/TRAINING PROGRAM

## 2025-04-08 RX ORDER — SULFAMETHOXAZOLE AND TRIMETHOPRIM 800; 160 MG/1; MG/1
1 TABLET ORAL 2 TIMES DAILY
Qty: 14 TABLET | Refills: 0 | Status: SHIPPED | OUTPATIENT
Start: 2025-04-08 | End: 2025-04-15

## 2025-04-08 NOTE — PROGRESS NOTES
[facility]  Obstetrics and Gynecology  Gyne Problem Visit      Katie Colby is a 49 year old female  presenting for follow-up. Noted to have bartholin cyst on exam after complaining of left labia swelling. She has been applying warm compresses to area for the last week and believes the swelling has gone down. Denies noting any purulent discharge from area. Denies any pain. No fevers or body aches.    Patient's last menstrual period was 03/15/2025 (exact date).     OBSTETRICS HISTORY:  OB History    Para Term  AB Living   3 3 0 0 0 0   SAB IAB Ectopic Multiple Live Births   0 0 0 0 0       GYNE HISTORY:      Menarche: 12y/o (2025  1:19 PM)  Period Cycle (Days): Irregular (2025  1:19 PM)  Hx Prior Abnormal Pap: No (2025  1:19 PM)         No data to display                  History   Sexual Activity    Sexual activity: Not on file       MEDICAL HISTORY:  Past Medical History:   Diagnosis Date    Screen for colon cancer     repeat CLN in 10 years     Past Surgical History:   Procedure Laterality Date    Anesth, section      X 3. pre eclampsia at end. no problems.     Colonoscopy  2024    Dr. Cid; hemorrhoids    Colonoscopy N/A 2024    Procedure: COLONOSCOPY;  Surgeon: IZAIAH Cid MD;  Location: [facility] ENDO    Egd  2024    Dr. Cid; gastric erythema       SOCIAL HISTORY:  Social History     Socioeconomic History    Marital status:      Spouse name: Not on file    Number of children: Not on file    Years of education: Not on file    Highest education level: Not on file   Occupational History    Occupation: works in metal    Tobacco Use    Smoking status: Never     Passive exposure: Never    Smokeless tobacco: Never   Vaping Use    Vaping status: Never Used   Substance and Sexual Activity    Alcohol use: Never    Drug use: Never    Sexual activity: Not on file   Other Topics Concern    Left Handed Not Asked    Right Handed Yes    Currently  spends a great deal of time in the sun Not Asked    Past Sunlamp Treatments for Acne Not Asked    History of tanning Not Asked    Hx of Spending Great Deal of Time in Sun Not Asked    Bad sunburns in the past Not Asked    Tanning Salons in the Past Not Asked    Hx of Radiation Treatments Not Asked    Regular use of sun block Not Asked   Social History Narrative    Not on file     Social Drivers of Health     Food Insecurity: Not on file   Transportation Needs: Not on file   Stress: Not on file   Housing Stability: Not on file       MEDICATIONS:    Current Outpatient Medications:     sulfamethoxazole-trimethoprim DS (BACTRIM DS) 800-160 MG Oral Tab per tablet, Take 1 tablet by mouth 2 (two) times daily for 7 days., Disp: 14 tablet, Rfl: 0    loratadine-pseudoephedrine ER (CLARITIN-D 12 HOUR) 5-120 MG Oral Tablet 12 Hr, Take 1 tablet by mouth 2 (two) times daily for 10 days., Disp: 20 tablet, Rfl: 0    fluticasone propionate 50 MCG/ACT Nasal Suspension, 2 sprays by Each Nare route daily for 360 doses., Disp: 9.9 mL, Rfl: 0    Meloxicam 7.5 MG Oral Tab, Take 1 tablet (7.5 mg total) by mouth daily as needed for Pain. (Patient not taking: Reported on 4/2/2025), Disp: 30 tablet, Rfl: 0    ALLERGIES:  Allergies[1]      REVIEW OF SYSTEMS:  Review of Systems   Constitutional:  Negative for chills, fever and unexpected weight change.   Respiratory: Negative.     Cardiovascular: Negative.    Gastrointestinal:  Negative for abdominal pain, constipation, diarrhea and nausea.   Genitourinary:  Negative for dyspareunia, dysuria, genital sores, hematuria, menstrual problem, pelvic pain, vaginal bleeding, vaginal discharge and vaginal pain.        Vulvar swelling   Musculoskeletal: Negative.    Skin: Negative.    Neurological: Negative.    Hematological: Negative.    Psychiatric/Behavioral: Negative.         PHYSICAL EXAM:  /87   Pulse 81   Wt 136 lb (61.7 kg)   LMP 03/15/2025 (Exact Date)   BMI 25.70 kg/m²     Chaperone  offered, pt accepted. Chaperone Jane Fleming MA.    GENERAL: well developed, well nourished, in no apparent distress  ABDOMEN: Soft, non distended; non tender, no masses  GYNE/: External Genitalia: 4 cm fluctuant left bartholin cyst with surrounding erythema and warmth. Non tender, non-draining. Urethral meatus appear wnl, no abnormal discharge or lesions noted.          Bladder: well supported, urethra wnl, no lesions or fissures                     Vagina: normal pink mucosa, no lesions, normal clear discharge.                      Uterus: mobile, non tender, normal size                     Cervix: Normal                      Adnexa: non tender, no masses, normal size     ASSESSMENT:       ICD-10-CM    1. Bartholin cyst  N75.0           Plan:  - Offered I&D today but patient declined as she was nervous about procedure. Reviewed procedure and associated risks with patient but she would like to wait another week to see if it will resolve on its own. No concern for immunocompromising conditions but as cyst appeared more inflamed today will start Bactrim for the next 7 days. Continue warm compresses and sitz baths. RTC in 1 week.      Requested Prescriptions     Signed Prescriptions Disp Refills    sulfamethoxazole-trimethoprim DS (BACTRIM DS) 800-160 MG Oral Tab per tablet 14 tablet 0     Sig: Take 1 tablet by mouth 2 (two) times daily for 7 days.       LUISITO BOYLE PA-C  11:11 AM  4/8/2025      Spent total time 20 minutes on obtaining history / chart review, evaluating patient / performing medically appropriate exam, discussing treatment options, counseling / educating, and completing documentation, coordinating care.         [1] No Known Allergies

## (undated) DEVICE — KIT ENDO ORCAPOD 160/180/190

## (undated) DEVICE — MEDI-VAC NON-CONDUCTIVE SUCTION TUBING 6MM X 1.8M (6FT.) L: Brand: CARDINAL HEALTH

## (undated) DEVICE — GIJAW SINGLE-USE BIOPSY FORCEPS WITH NEEDLE: Brand: GIJAW

## (undated) DEVICE — CONMED SCOPE SAVER BITE BLOCK, 20X27 MM: Brand: SCOPE SAVER

## (undated) DEVICE — 60 ML SYRINGE REGULAR TIP: Brand: MONOJECT

## (undated) DEVICE — Device: Brand: DUAL NARE NASAL CANNULAE FEMALE LUER CON 7FT O2 TUBE

## (undated) DEVICE — KIT CLEAN ENDOKIT 1.1OZ GOWNX2

## (undated) DEVICE — MEDI-VAC NON-CONDUCTIVE SUCTION TUBING: Brand: CARDINAL HEALTH

## (undated) DEVICE — YANKAUER,BULB TIP,W/O VENT,RIGID,STERILE: Brand: MEDLINE

## (undated) NOTE — LETTER
[facility]  Administration of Anesthesia  I, Katie Colby agree to be cared for by a physician anesthesiologist alone and/or with a nurse anesthetist, who is specially trained to monitor me and give me medicine to put me to sleep or keep me comfortable during my procedure    I understand that my anesthesiologist and/or anesthetist is not an employee or agent of [facility] or [employer]. He or she works for [facility]    As the patient asking for anesthesia services, I agree to:  Allow the anesthesiologist (anesthesia doctor) to give me medicine and do additional procedures as necessary. Some examples are: Starting or using an “IV” to give me medicine, fluids or blood during my procedure, and having a breathing tube placed to help me breathe when I’m asleep (intubation). In the event that my heart stops working properly, I understand that my anesthesiologist will make every effort to sustain my life, unless otherwise directed by [facility] Do Not Resuscitate documents.  Tell my anesthesia doctor before my procedure:  If I am pregnant.  The last time that I ate or drank.  iii. All of the medicines I take (including prescriptions, herbal supplements, and pills I can buy without a prescription (including street drugs/illegal medications). Failure to inform my anesthesiologist about these medicines may increase my risk of anesthetic complications.  iv.If I am allergic to anything or have had a reaction to anesthesia before.  I understand how the anesthesia medicine will help me (benefits).  I understand that with any type of anesthesia medicine there are risks:  The most common risks are: nausea, vomiting, sore throat, muscle soreness, damage to my eyes, mouth, or teeth (from breathing tube placement).  Rare risks include: remembering what happened during my procedure, allergic reactions to medications, injury to my airway, heart, lungs, vision,  nerves, or muscles and in extremely rare instances death.  My doctor has explained to me other choices available to me for my care (alternatives).  Pregnant Patients (“epidural”):  I understand that the risks of having an epidural (medicine given into my back to help control pain during labor), include itching, low blood pressure, difficulty urinating, headache or slowing of the baby’s heart. Very rare risks include infection, bleeding, seizure, irregular heart rhythms and nerve injury.  Regional Anesthesia (“spinal”, “epidural”, & “nerve blocks”):  I understand that rare but potential complications include headache, bleeding, infection, seizure, irregular heart rhythms, and nerve injury.    _____________________________________________________________________________  Patient (or Representative) Signature/Relationship to Patient  Date   Time    _____________________________________________________________________________   Name (if used)    Language/Organization   Time    _____________________________________________________________________________  Nurse Anesthetist Signature     Date   Time  _____________________________________________________________________________  Anesthesiologist Signature     Date   Time  I have discussed the procedure and information above with the patient (or patient’s representative) and answered their questions. The patient or their representative has agreed to have anesthesia services.    _____________________________________________________________________________  Witness        Date   Time  I have verified that the signature is that of the patient or patient’s representative, and that it was signed before the procedure  Patient Name: Katie Colby     : 10/7/1975                 Printed: 6/3/2024 at 6:42 AM    Medical Record #: [id number]                                            Page 1 of 1  ----------ANESTHESIA CONSENT----------

## (undated) NOTE — LETTER
Date & Time: 5/23/2024, 5:33 PM  Patient: Katie Colby  Encounter Provider(s):    Chelsie Moore APRN       To Whom It May Concern:    Katie Colby was seen and treated in our department on 5/23/2024. She can return to work.    If you have any questions or concerns, please do not hesitate to call.      MEY Mcgovern  _____________________________  Physician/APC Signature

## (undated) NOTE — LETTER
[facility] [address], [place], [place]    Authorization for Surgical Operation and Procedure                               I hereby authorize IZAIAH Cid MD, my physician and his/her assistants (if applicable), which may include medical students, residents, and/or fellows, to perform the following surgical operation/ procedure and administer such anesthesia as may be determined necessary by my physician: Operation/Procedure name (s) COLONOSCOPY/ESOPHAGOGASTRODUODENOSCOPY (EGD) on Katie Colby   2.   I recognize that during the surgical operation/procedure, unforeseen conditions may necessitate additional or different procedures than those listed above.  I, therefore, further authorize and request that the above-named surgeon, assistants, or designees perform such procedures as are, in their judgment, necessary and desirable.    3.   My surgeon/physician has discussed prior to my surgery the potential benefits, risks and side effects of this procedure; the likelihood of achieving goals; and potential problems that might occur during recuperation.  They also discussed reasonable alternatives to the procedure, including risks, benefits, and side effects related to the alternatives and risks related to not receiving this procedure.  I have had all my questions answered and I acknowledge that no guarantee has been made as to the result that may be obtained.    4.   Should the need arise during my operation/procedure, which includes change of level of care prior to discharge, I also consent to the administration of blood and/or blood products.  Further, I understand that despite careful testing and screening of blood or blood products by collecting agencies, I may still be subject to ill effects as a result of receiving a blood transfusion and/or blood products.  The following are some, but not all, of the potential risks that can occur: fever and allergic reactions, hemolytic  reactions, transmission of diseases such as Hepatitis, AIDS and Cytomegalovirus (CMV) and fluid overload.  In the event that I wish to have an autologous transfusion of my own blood, or a directed donor transfusion, I will discuss this with my physician.  Check only if Refusing Blood or Blood Products  I understand refusal of blood or blood products as deemed necessary by my physician may have serious consequences to my condition to include possible death. I hereby assume responsibility for my refusal and release the hospital, its personnel, and my physicians from any responsibility for the consequences of my refusal.    o  Refuse   5.   I authorize the use of any specimen, organs, tissues, body parts or foreign objects that may be removed from my body during the operation/procedure for diagnosis, research or teaching purposes and their subsequent disposal by hospital authorities.  I also authorize the release of specimen test results and/or written reports to my treating physician on the hospital medical staff or other referring or consulting physicians involved in my care, at the discretion of the Pathologist or my treating physician.    6.   I consent to the photographing or videotaping of the operations or procedures to be performed, including appropriate portions of my body for medical, scientific, or educational purposes, provided my identity is not revealed by the pictures or by descriptive texts accompanying them.  If the procedure has been photographed/videotaped, the surgeon will obtain the original picture, image, videotape or CD.  The hospital will not be responsible for storage, release or maintenance of the picture, image, tape or CD.    7.   I consent to the presence of a  or observers in the operating room as deemed necessary by my physician or their designees.    8.   I recognize that in the event my procedure results in extended X-Ray/fluoroscopy time, I may develop a skin  reaction.    9. If I have a Do Not Attempt Resuscitation (DNAR) order in place, that status will be suspended while in the operating room, procedural suite, and during the recovery period unless otherwise explicitly stated by me (or a person authorized to consent on my behalf). The surgeon or my attending physician will determine when the applicable recovery period ends for purposes of reinstating the DNAR order.  10. Patients having a sterilization procedure: I understand that if the procedure is successful the results will be permanent and it will therefore be impossible for me to inseminate, conceive, or bear children.  I also understand that the procedure is intended to result in sterility, although the result has not been guaranteed.   11. I acknowledge that my physician has explained sedation/analgesia administration to me including the risk and benefits I consent to the administration of sedation/analgesia as may be necessary or desirable in the judgment of my physician.    I CERTIFY THAT I HAVE READ AND FULLY UNDERSTAND THE ABOVE CONSENT TO OPERATION and/or OTHER PROCEDURE.     ____________________________________  _________________________________        ______________________________  Signature of Patient    Signature of Responsible Person                Printed Name of Responsible Person                                      ____________________________________  _____________________________                ________________________________  Signature of Witness        Date  Time         Relationship to Patient    STATEMENT OF PHYSICIAN My signature below affirms that prior to the time of the procedure; I have explained to the patient and/or his/her legal representative, the risks and benefits involved in the proposed treatment and any reasonable alternative to the proposed treatment. I have also explained the risks and benefits involved in refusal of the proposed treatment and alternatives to the proposed  treatment and have answered the patient's questions. If I have a significant financial interest in a co-management agreement or a significant financial interest in any product or implant, or other significant relationship used in this procedure/surgery, I have disclosed this and had a discussion with my patient.     _____________________________________________________              _____________________________  (Signature of Physician)                                                                                         (Date)                                   (Time)  Patient Name: Katie Herreraleticia Colby      : 10/7/1975      Printed: 6/3/2024     Medical Record #: [id number]                                      Page 1 of 1

## (undated) NOTE — LETTER
AUTHORIZATION FOR SURGICAL OPERATION OR OTHER PROCEDURE    1. I hereby authorize Dr. Khan, and [facility] staff assigned to my case to perform the following operation and/or procedure at the [facility] site:    _______________________________________________________________________________________________    Steroid injection left index finger, left ring finger  _______________________________________________________________________________________________    2.  My physician has explained the nature and purpose of the operation or other procedure, possible alternative methods of treatment, the risks involved, and the possibility of complication to me.  I acknowledge that no guarantee has been made as to the result that may be obtained.  3.  I recognize that, during the course of this operation, or other procedure, unforseen conditions may necessitate additional or different procedure than those listed above.  I, therefore, further authorize and request that the above named physician, his/her physician assistants or designees perform such procedures as are, in his/her professional opinion, necessary and desirable.  4.  Any tissue or organs removed in the operation or other procedure may be disposed of by and at the discretion of the [facility] and [facility].  5.  I understand that in the event of a medical emergency, I will be transported by local paramedics to [facility] or other hospital emergency department.  6.  I certify that I have read and fully understand the above consent to operation and/or other procedure.    7.  I acknowledge that my physician has explained sedation/analgesia administration to me including the risks and benefits.  I consent to the administration of sedation/analgesia as may be necessary or desirable in the judgement of my physician.    Witness signature: ___________________________________________________ Date:   ______/______/_____                    Time:  ________ A.M.  P.M.       Patient Name:  ______________________________________________________  (please print)      Patient signature:  ___________________________________________________             Relationship to Patient:           []  Parent    Responsible person                          []  Spouse  In case of minor or                    [] Other  _____________   Incompetent name:  __________________________________________________                               (please print)      _____________      Responsible person  In case of minor or  Incompetent signature:  _______________________________________________    Statement of Physician  My signature below affirms that prior to the time of the procedure, I have explained to the patient and/or his/her guardian, the risks and benefits involved in the proposed treatment and any reasonable alternative to the proposed treatment.  I have also explained the risks and benefits involved in the refusal of the proposed treatment and have answered the patient's questions.                        Date:  ______/______/_______  Provider                      Signature:  __________________________________________________________       Time:  ___________ A.M    P.M.

## (undated) NOTE — LETTER
June 5, 2024    Patient: Katie Colby   Date of Visit: 06/05/2024       To Whom It May Concern:    Katie Colby was seen and treated in our endoscopy department on 6/05/2024. She can return to work on 6/6/2024    If you have any questions or concerns, please don't hesitate to call.    Encounter Provider(s):    IZAIAH Cid MD  [phone]

## (undated) NOTE — LETTER
4/2/2024              Katie Colby        [address] [address]         Dear Katie,    Our records indicate that the tests ordered for you by MEY Seth  have not been done.    MRI ABDOMEN (W+WO) (CPT=74183) (Order #[id number]) on 2/27/24     If you have, in fact, already completed the tests or you do not wish to have the tests done, please contact our office at THE NUMBER LISTED BELOW.  Otherwise, please proceed with the testing.         Sincerely,    MEY Seth  [facility]  [address]  [place] [address] [phone]